# Patient Record
Sex: FEMALE | Race: WHITE | NOT HISPANIC OR LATINO | URBAN - METROPOLITAN AREA
[De-identification: names, ages, dates, MRNs, and addresses within clinical notes are randomized per-mention and may not be internally consistent; named-entity substitution may affect disease eponyms.]

---

## 2017-02-17 ENCOUNTER — EMERGENCY (EMERGENCY)
Facility: HOSPITAL | Age: 22
LOS: 1 days | Discharge: HOME | End: 2017-02-17
Attending: EMERGENCY MEDICINE | Admitting: EMERGENCY MEDICINE
Payer: MEDICAID

## 2017-02-17 VITALS — WEIGHT: 229.94 LBS | HEIGHT: 66 IN

## 2017-02-17 VITALS
HEART RATE: 77 BPM | RESPIRATION RATE: 20 BRPM | DIASTOLIC BLOOD PRESSURE: 90 MMHG | SYSTOLIC BLOOD PRESSURE: 121 MMHG | OXYGEN SATURATION: 99 % | TEMPERATURE: 98 F

## 2017-02-17 PROCEDURE — 73630 X-RAY EXAM OF FOOT: CPT

## 2017-02-17 PROCEDURE — 29515 APPLICATION SHORT LEG SPLINT: CPT

## 2017-02-17 PROCEDURE — 29515 APPLICATION SHORT LEG SPLINT: CPT | Mod: LT

## 2017-02-17 PROCEDURE — 99284 EMERGENCY DEPT VISIT MOD MDM: CPT | Mod: 25

## 2017-02-17 PROCEDURE — 73610 X-RAY EXAM OF ANKLE: CPT

## 2017-02-17 PROCEDURE — 73630 X-RAY EXAM OF FOOT: CPT | Mod: 26,LT

## 2017-02-17 PROCEDURE — 73610 X-RAY EXAM OF ANKLE: CPT | Mod: 26,LT

## 2017-02-17 RX ORDER — IBUPROFEN 200 MG
800 TABLET ORAL ONCE
Qty: 0 | Refills: 0 | Status: COMPLETED | OUTPATIENT
Start: 2017-02-17 | End: 2017-02-17

## 2017-02-17 RX ORDER — IBUPROFEN 200 MG
1 TABLET ORAL
Qty: 40 | Refills: 0 | OUTPATIENT
Start: 2017-02-17 | End: 2017-02-27

## 2017-02-17 RX ADMIN — Medication 800 MILLIGRAM(S): at 17:20

## 2017-02-17 NOTE — ED STATDOCS - MEDICAL DECISION MAKING DETAILS
Left ankle pain and swelling, s/p twisting. Increased pain with ambulating and ambulation. Pt did not fall. Will x-ray for bony injury, give pain meds, and re-evaluate.

## 2017-02-17 NOTE — ED STATDOCS - OBJECTIVE STATEMENT
A 21 year old female pt presents to the ED s/p fall. The pt was walking approx. 1 hour ago when she tripped and twisted her ankle, injuring her left foot. The pt reports left ankle/foot pain that is worse on the lateral aspect of the foot. The pt denies any PMHx, PSHx, or chance that she is pregnant. She did not hit her head or experience LOC. Pt reports that the pain is worse with ambulation. No further complaints at this time.

## 2017-02-17 NOTE — ED STATDOCS - ATTENDING CONTRIBUTION TO CARE
I, Gab Dunn, performed the initial face to face bedside interview with this patient regarding history of present illness, review of symptoms and relevant past medical, social and family history.  I completed an independent physical examination.  I was the initial provider who evaluated this patient. I have signed out the follow up of any pending tests (i.e. labs, radiological studies) to the ACP.  I have communicated the patient’s plan of care and disposition with the ACP.  The history, relevant review of systems, past medical and surgical history, medical decision making, and physical examination was documented by the scribe in my presence and I attest to the accuracy of the documentation.

## 2017-02-17 NOTE — ED STATDOCS - NS ED MD SCRIBE ATTENDING SCRIBE SECTIONS
VITAL SIGNS( Pullset)/PHYSICAL EXAM/DISPOSITION/HISTORY OF PRESENT ILLNESS/HIV/INTAKE ASSESSMENT/SCREENINGS/PAST MEDICAL/SURGICAL/SOCIAL HISTORY/REVIEW OF SYSTEMS

## 2017-02-17 NOTE — ED STATDOCS - PROGRESS NOTE DETAILS
Pt seen and evaluated. Pt s/p trip and fall today while walking with c/o right foot pain. + ttp to metatarsal. No STS. no ecchymosis. No defor. Toes warm and mobile. DP intact. XRAY + mid 5th metatarsal fx. Splint/Shoe/Crutches and Podiatry f/u

## 2017-02-17 NOTE — ED STATDOCS - MUSCULOSKELETAL, MLM
Supple neck, full ROM. Spine is midline. Point tenderness below left lateral malleolus. No gross deformity or erythema. Minimal swelling to the foot.

## 2017-02-23 ENCOUNTER — APPOINTMENT (OUTPATIENT)
Dept: ORTHOPEDIC SURGERY | Facility: CLINIC | Age: 22
End: 2017-02-23

## 2017-03-07 ENCOUNTER — APPOINTMENT (OUTPATIENT)
Dept: ORTHOPEDIC SURGERY | Facility: CLINIC | Age: 22
End: 2017-03-07

## 2017-03-15 ENCOUNTER — OUTPATIENT (OUTPATIENT)
Dept: OUTPATIENT SERVICES | Facility: HOSPITAL | Age: 22
LOS: 1 days | Discharge: HOME | End: 2017-03-15
Payer: MEDICAID

## 2017-03-15 DIAGNOSIS — S92.355A NONDISPLACED FRACTURE OF FIFTH METATARSAL BONE, LEFT FOOT, INITIAL ENCOUNTER FOR CLOSED FRACTURE: ICD-10-CM

## 2017-03-15 DIAGNOSIS — Z01.818 ENCOUNTER FOR OTHER PREPROCEDURAL EXAMINATION: ICD-10-CM

## 2017-03-15 LAB
ABO RH CONFIRMATION: SIGNIFICANT CHANGE UP
ANION GAP SERPL CALC-SCNC: 8 MMOL/L — SIGNIFICANT CHANGE UP (ref 5–17)
APPEARANCE UR: CLEAR — SIGNIFICANT CHANGE UP
APTT BLD: 31 SEC — SIGNIFICANT CHANGE UP (ref 27.5–37.4)
BASOPHILS # BLD AUTO: 0.1 K/UL — SIGNIFICANT CHANGE UP (ref 0–0.2)
BASOPHILS NFR BLD AUTO: 0.8 % — SIGNIFICANT CHANGE UP (ref 0–2)
BILIRUB UR-MCNC: NEGATIVE — SIGNIFICANT CHANGE UP
BLD GP AB SCN SERPL QL: SIGNIFICANT CHANGE UP
BUN SERPL-MCNC: 12 MG/DL — SIGNIFICANT CHANGE UP (ref 7–23)
CALCIUM SERPL-MCNC: 8.9 MG/DL — SIGNIFICANT CHANGE UP (ref 8.5–10.1)
CHLORIDE SERPL-SCNC: 104 MMOL/L — SIGNIFICANT CHANGE UP (ref 96–108)
CO2 SERPL-SCNC: 30 MMOL/L — SIGNIFICANT CHANGE UP (ref 22–31)
COLOR SPEC: YELLOW — SIGNIFICANT CHANGE UP
CREAT SERPL-MCNC: 0.66 MG/DL — SIGNIFICANT CHANGE UP (ref 0.5–1.3)
DIFF PNL FLD: NEGATIVE — SIGNIFICANT CHANGE UP
EOSINOPHIL # BLD AUTO: 0.5 K/UL — SIGNIFICANT CHANGE UP (ref 0–0.5)
EOSINOPHIL NFR BLD AUTO: 6.6 % — HIGH (ref 0–6)
GLUCOSE SERPL-MCNC: 81 MG/DL — SIGNIFICANT CHANGE UP (ref 70–99)
GLUCOSE UR QL: NEGATIVE MG/DL — SIGNIFICANT CHANGE UP
HCT VFR BLD CALC: 47.2 % — HIGH (ref 34.5–45)
HGB BLD-MCNC: 16 G/DL — HIGH (ref 11.5–15.5)
INR BLD: 0.95 RATIO — SIGNIFICANT CHANGE UP (ref 0.88–1.16)
KETONES UR-MCNC: NEGATIVE — SIGNIFICANT CHANGE UP
LEUKOCYTE ESTERASE UR-ACNC: NEGATIVE — SIGNIFICANT CHANGE UP
LYMPHOCYTES # BLD AUTO: 2.2 K/UL — SIGNIFICANT CHANGE UP (ref 1–3.3)
LYMPHOCYTES # BLD AUTO: 27.9 % — SIGNIFICANT CHANGE UP (ref 13–44)
MCHC RBC-ENTMCNC: 29.4 PG — SIGNIFICANT CHANGE UP (ref 27–34)
MCHC RBC-ENTMCNC: 34 GM/DL — SIGNIFICANT CHANGE UP (ref 32–36)
MCV RBC AUTO: 86.7 FL — SIGNIFICANT CHANGE UP (ref 80–100)
MONOCYTES # BLD AUTO: 0.5 K/UL — SIGNIFICANT CHANGE UP (ref 0–0.9)
MONOCYTES NFR BLD AUTO: 6.7 % — SIGNIFICANT CHANGE UP (ref 2–14)
MRSA PCR RESULT.: SIGNIFICANT CHANGE UP
NEUTROPHILS # BLD AUTO: 4.7 K/UL — SIGNIFICANT CHANGE UP (ref 1.8–7.4)
NEUTROPHILS NFR BLD AUTO: 58 % — SIGNIFICANT CHANGE UP (ref 43–77)
NITRITE UR-MCNC: NEGATIVE — SIGNIFICANT CHANGE UP
PH UR: 7 — SIGNIFICANT CHANGE UP (ref 4.8–8)
PLATELET # BLD AUTO: 274 K/UL — SIGNIFICANT CHANGE UP (ref 150–400)
POTASSIUM SERPL-MCNC: 4.6 MMOL/L — SIGNIFICANT CHANGE UP (ref 3.5–5.3)
POTASSIUM SERPL-SCNC: 4.6 MMOL/L — SIGNIFICANT CHANGE UP (ref 3.5–5.3)
PROT UR-MCNC: NEGATIVE MG/DL — SIGNIFICANT CHANGE UP
PROTHROM AB SERPL-ACNC: 10.4 SEC — SIGNIFICANT CHANGE UP (ref 10–13.1)
RBC # BLD: 5.45 M/UL — HIGH (ref 3.8–5.2)
RBC # FLD: 12.1 % — SIGNIFICANT CHANGE UP (ref 10.3–14.5)
S AUREUS DNA NOSE QL NAA+PROBE: SIGNIFICANT CHANGE UP
SODIUM SERPL-SCNC: 142 MMOL/L — SIGNIFICANT CHANGE UP (ref 135–145)
SP GR SPEC: 1.01 — SIGNIFICANT CHANGE UP (ref 1.01–1.02)
TYPE + AB SCN PNL BLD: SIGNIFICANT CHANGE UP
UROBILINOGEN FLD QL: NEGATIVE MG/DL — SIGNIFICANT CHANGE UP
WBC # BLD: 8.1 K/UL — SIGNIFICANT CHANGE UP (ref 3.8–10.5)
WBC # FLD AUTO: 8.1 K/UL — SIGNIFICANT CHANGE UP (ref 3.8–10.5)

## 2017-03-15 PROCEDURE — 93010 ELECTROCARDIOGRAM REPORT: CPT

## 2017-03-15 NOTE — ASU PATIENT PROFILE, ADULT - PMH
Asthma  as a child  Fracture of bone  left 5th metatarsal  MRSA (methicillin resistant Staphylococcus aureus)    Obesity

## 2017-03-15 NOTE — CHART NOTE - NSCHARTNOTEFT_GEN_A_CORE
Plan  1. Stop all NSAIDS, herbal supplements and vitamins for 7 days.  2. NPO at midnight.  3. Use EZ sponges as directed  4. Use mupirocin as directed

## 2017-03-20 RX ORDER — FENTANYL CITRATE 50 UG/ML
50 INJECTION INTRAVENOUS
Qty: 0 | Refills: 0 | Status: DISCONTINUED | OUTPATIENT
Start: 2017-03-21 | End: 2017-03-21

## 2017-03-20 RX ORDER — ONDANSETRON 8 MG/1
4 TABLET, FILM COATED ORAL ONCE
Qty: 0 | Refills: 0 | Status: COMPLETED | OUTPATIENT
Start: 2017-03-21 | End: 2017-03-21

## 2017-03-20 RX ORDER — FAMOTIDINE 10 MG/ML
20 INJECTION INTRAVENOUS ONCE
Qty: 0 | Refills: 0 | Status: COMPLETED | OUTPATIENT
Start: 2017-03-21 | End: 2017-03-21

## 2017-03-20 RX ORDER — SODIUM CHLORIDE 9 MG/ML
3 INJECTION INTRAMUSCULAR; INTRAVENOUS; SUBCUTANEOUS EVERY 8 HOURS
Qty: 0 | Refills: 0 | Status: DISCONTINUED | OUTPATIENT
Start: 2017-03-21 | End: 2017-03-21

## 2017-03-20 RX ORDER — SODIUM CHLORIDE 9 MG/ML
1000 INJECTION INTRAMUSCULAR; INTRAVENOUS; SUBCUTANEOUS
Qty: 0 | Refills: 0 | Status: DISCONTINUED | OUTPATIENT
Start: 2017-03-21 | End: 2017-03-21

## 2017-03-21 ENCOUNTER — OUTPATIENT (OUTPATIENT)
Dept: OUTPATIENT SERVICES | Facility: HOSPITAL | Age: 22
LOS: 1 days | Discharge: HOME | End: 2017-03-21

## 2017-03-21 ENCOUNTER — APPOINTMENT (OUTPATIENT)
Dept: ORTHOPEDIC SURGERY | Facility: HOSPITAL | Age: 22
End: 2017-03-21

## 2017-03-21 VITALS
DIASTOLIC BLOOD PRESSURE: 65 MMHG | RESPIRATION RATE: 16 BRPM | SYSTOLIC BLOOD PRESSURE: 118 MMHG | OXYGEN SATURATION: 99 % | TEMPERATURE: 98 F | WEIGHT: 250.45 LBS | HEIGHT: 66 IN

## 2017-03-21 VITALS
TEMPERATURE: 97 F | HEART RATE: 60 BPM | RESPIRATION RATE: 16 BRPM | DIASTOLIC BLOOD PRESSURE: 57 MMHG | SYSTOLIC BLOOD PRESSURE: 123 MMHG | OXYGEN SATURATION: 98 %

## 2017-03-21 LAB — HCG UR QL: NEGATIVE — SIGNIFICANT CHANGE UP

## 2017-03-21 RX ORDER — OXYCODONE AND ACETAMINOPHEN 5; 325 MG/1; MG/1
1 TABLET ORAL
Qty: 28 | Refills: 0
Start: 2017-03-21 | End: 2017-03-28

## 2017-03-21 RX ORDER — HYDROMORPHONE HYDROCHLORIDE 2 MG/ML
1 INJECTION INTRAMUSCULAR; INTRAVENOUS; SUBCUTANEOUS
Qty: 0 | Refills: 0 | Status: DISCONTINUED | OUTPATIENT
Start: 2017-03-21 | End: 2017-03-21

## 2017-03-21 RX ORDER — SODIUM CHLORIDE 9 MG/ML
1000 INJECTION, SOLUTION INTRAVENOUS
Qty: 0 | Refills: 0 | Status: DISCONTINUED | OUTPATIENT
Start: 2017-03-21 | End: 2017-04-05

## 2017-03-21 RX ORDER — MEPERIDINE HYDROCHLORIDE 50 MG/ML
12.5 INJECTION INTRAMUSCULAR; INTRAVENOUS; SUBCUTANEOUS
Qty: 0 | Refills: 0 | Status: DISCONTINUED | OUTPATIENT
Start: 2017-03-21 | End: 2017-03-21

## 2017-03-21 RX ORDER — OXYCODONE HYDROCHLORIDE 5 MG/1
5 TABLET ORAL EVERY 4 HOURS
Qty: 0 | Refills: 0 | Status: DISCONTINUED | OUTPATIENT
Start: 2017-03-21 | End: 2017-03-21

## 2017-03-21 RX ADMIN — ONDANSETRON 4 MILLIGRAM(S): 8 TABLET, FILM COATED ORAL at 14:30

## 2017-03-21 RX ADMIN — SODIUM CHLORIDE 75 MILLILITER(S): 9 INJECTION INTRAMUSCULAR; INTRAVENOUS; SUBCUTANEOUS at 14:49

## 2017-03-21 RX ADMIN — FENTANYL CITRATE 50 MICROGRAM(S): 50 INJECTION INTRAVENOUS at 14:45

## 2017-03-21 RX ADMIN — FENTANYL CITRATE 50 MICROGRAM(S): 50 INJECTION INTRAVENOUS at 14:40

## 2017-03-21 RX ADMIN — FENTANYL CITRATE 50 MICROGRAM(S): 50 INJECTION INTRAVENOUS at 15:16

## 2017-03-21 RX ADMIN — FAMOTIDINE 20 MILLIGRAM(S): 10 INJECTION INTRAVENOUS at 09:28

## 2017-03-21 RX ADMIN — FENTANYL CITRATE 50 MICROGRAM(S): 50 INJECTION INTRAVENOUS at 15:10

## 2017-03-21 NOTE — ASU DISCHARGE PLAN (ADULT/PEDIATRIC). - NURSING INSTRUCTIONS
Begin with liquids and light food ( tea, toast, Jello, soups). Advance to what you normally eat. Liquids should taken in adequate amounts today.     CALL the DOCTOR:    -Fever greater than  101F  - Signs  of infection such as : increase pain,swelling,redness,or a bad  smell coming from the wound.  -Excessive amount of bleeding.  - Any pain that appears to be getting worse.  - Vomiting  -  If you have  not urinated 8 hours after surgery or have any difficulty urinating.   - difficulty breathing    A responsible adult should be with you for the rest of the day and night for your safety and to help you if you needed.    Review attached FACT SHEET if applicable.

## 2017-03-21 NOTE — ASU DISCHARGE PLAN (ADULT/PEDIATRIC). - NOTIFY
Numbness, tingling/Bleeding that does not stop/Numbness, color, or temperature change to extremity/Pain not relieved by Medications/Swelling that continues/Fever greater than 101

## 2017-03-21 NOTE — ASU DISCHARGE PLAN (ADULT/PEDIATRIC). - MEDICATION SUMMARY - MEDICATIONS TO TAKE
I will START or STAY ON the medications listed below when I get home from the hospital:    oxyCODONE-acetaminophen 5mg-325mg oral tablet  -- 1 tab(s) by mouth every 6 hours, As Needed MDD:4  -- Caution federal law prohibits the transfer of this drug to any person other  than the person for whom it was prescribed.  May cause drowsiness.  Alcohol may intensify this effect.  Use care when operating dangerous machinery.  This prescription cannot be refilled.  This product contains acetaminophen.  Do not use  with any other product containing acetaminophen to prevent possible liver damage.  Using more of this medication than prescribed may cause serious breathing problems.    -- Indication: For pain

## 2017-03-21 NOTE — BRIEF OPERATIVE NOTE - PROCEDURE
Open reduction and internal fixation (ORIF) of fracture of metatarsal bone  03/21/2017    Active  RIKKI

## 2017-03-21 NOTE — BRIEF OPERATIVE NOTE - POST-OP DX
Closed nondisplaced fracture of fifth metatarsal bone of left foot, initial encounter  03/21/2017    Active  Brannon Jesus
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
## 2017-03-28 DIAGNOSIS — S92.352A DISPLACED FRACTURE OF FIFTH METATARSAL BONE, LEFT FOOT, INITIAL ENCOUNTER FOR CLOSED FRACTURE: ICD-10-CM
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
## 2017-03-31 ENCOUNTER — APPOINTMENT (OUTPATIENT)
Dept: ORTHOPEDIC SURGERY | Facility: CLINIC | Age: 22
End: 2017-03-31

## 2017-03-31 DIAGNOSIS — S92.355A NONDISPLACED FRACTURE OF FIFTH METATARSAL BONE, LEFT FOOT, INITIAL ENCOUNTER FOR CLOSED FRACTURE: ICD-10-CM

## 2017-04-05 ENCOUNTER — APPOINTMENT (OUTPATIENT)
Dept: ORTHOPEDIC SURGERY | Facility: CLINIC | Age: 22
End: 2017-04-05

## 2017-04-14 ENCOUNTER — APPOINTMENT (OUTPATIENT)
Dept: ORTHOPEDIC SURGERY | Facility: CLINIC | Age: 22
End: 2017-04-14

## 2017-05-12 ENCOUNTER — APPOINTMENT (OUTPATIENT)
Dept: ORTHOPEDIC SURGERY | Facility: CLINIC | Age: 22
End: 2017-05-12

## 2017-05-12 DIAGNOSIS — S92.355D NONDISPLACED FRACTURE OF FIFTH METATARSAL BONE, LEFT FOOT, SUBSEQUENT ENCOUNTER FOR FRACTURE WITH ROUTINE HEALING: ICD-10-CM

## 2017-06-23 ENCOUNTER — APPOINTMENT (OUTPATIENT)
Dept: ORTHOPEDIC SURGERY | Facility: CLINIC | Age: 22
End: 2017-06-23

## 2017-07-22 ENCOUNTER — EMERGENCY (EMERGENCY)
Facility: HOSPITAL | Age: 22
LOS: 1 days | Discharge: HOME | End: 2017-07-22
Attending: EMERGENCY MEDICINE
Payer: MEDICAID

## 2017-07-22 VITALS
WEIGHT: 244.93 LBS | HEIGHT: 65 IN | TEMPERATURE: 98 F | RESPIRATION RATE: 20 BRPM | DIASTOLIC BLOOD PRESSURE: 75 MMHG | OXYGEN SATURATION: 92 % | HEART RATE: 94 BPM | SYSTOLIC BLOOD PRESSURE: 140 MMHG

## 2017-07-22 VITALS
DIASTOLIC BLOOD PRESSURE: 92 MMHG | SYSTOLIC BLOOD PRESSURE: 152 MMHG | RESPIRATION RATE: 16 BRPM | HEART RATE: 90 BPM | OXYGEN SATURATION: 97 % | TEMPERATURE: 98 F

## 2017-07-22 PROCEDURE — 99285 EMERGENCY DEPT VISIT HI MDM: CPT | Mod: 25

## 2017-07-22 PROCEDURE — 94640 AIRWAY INHALATION TREATMENT: CPT

## 2017-07-22 PROCEDURE — 99283 EMERGENCY DEPT VISIT LOW MDM: CPT

## 2017-07-22 RX ORDER — ALBUTEROL 90 UG/1
2 AEROSOL, METERED ORAL EVERY 4 HOURS
Qty: 0 | Refills: 0 | Status: DISCONTINUED | OUTPATIENT
Start: 2017-07-22 | End: 2017-07-26

## 2017-07-22 RX ORDER — ALBUTEROL 90 UG/1
2.5 AEROSOL, METERED ORAL
Qty: 0 | Refills: 0 | Status: COMPLETED | OUTPATIENT
Start: 2017-07-22 | End: 2017-07-22

## 2017-07-22 RX ADMIN — ALBUTEROL 2.5 MILLIGRAM(S): 90 AEROSOL, METERED ORAL at 13:31

## 2017-07-22 RX ADMIN — ALBUTEROL 2.5 MILLIGRAM(S): 90 AEROSOL, METERED ORAL at 13:25

## 2017-07-22 RX ADMIN — Medication 40 MILLIGRAM(S): at 13:24

## 2017-07-22 RX ADMIN — ALBUTEROL 2.5 MILLIGRAM(S): 90 AEROSOL, METERED ORAL at 15:41

## 2017-07-22 RX ADMIN — ALBUTEROL 2 PUFF(S): 90 AEROSOL, METERED ORAL at 15:41

## 2017-07-22 NOTE — ED STATDOCS - PROGRESS NOTE DETAILS
NP NOTE: Pt seen by intake physician and HPI/ROS/PE/MDM reviewed. Pt seen and evaluated. Discussed plan and any resulted studies at this time. Will continue to monitor and re-evaluate.  Re-Evaluation: pt receiving treatments at this time, LS with diffuse wheeze. no acute distress at this time, will continue to re-eval. peak flow 350. will give 2 puffs with mdi+spacer and dc. improved aeration, mild end-expiratory wheeze. pt feeling improved, will dc, pt will fu with pmd.

## 2017-07-22 NOTE — ED STATDOCS - ATTENDING CONTRIBUTION TO CARE
I, Mariana Win, performed the initial face to face bedside interview with this patient regarding history of present illness, review of symptoms and relevant past medical, social and family history.  I completed an independent physical examination.  I was the initial provider who evaluated this patient. I have signed out the follow up of any pending tests (i.e. labs, radiological studies) to the ACP.  I have communicated the patient’s plan of care and disposition with the ACP.  The history, relevant review of systems, past medical and surgical history, medical decision making, and physical examination was documented by the scribe in my presence and I attest to the accuracy of the documentation.

## 2017-07-22 NOTE — ED STATDOCS - OBJECTIVE STATEMENT
21 y/o F pt with hx of asthma and allergies presents to ED c/o productive cough, wheezing for 4-5 days. Pt states she hasn't had an asthma attack in a long day. Hospitalization needed when she was younger. No medication at  home. No chest pain, SOB, abdominal pain, nausea, vomiting, fevers, chills, rhinorrhea. No further complaints at this time.

## 2018-04-17 ENCOUNTER — EMERGENCY (EMERGENCY)
Facility: HOSPITAL | Age: 23
LOS: 1 days | Discharge: HOME | End: 2018-04-17
Attending: EMERGENCY MEDICINE
Payer: MEDICAID

## 2018-04-17 VITALS
OXYGEN SATURATION: 97 % | TEMPERATURE: 98 F | RESPIRATION RATE: 20 BRPM | DIASTOLIC BLOOD PRESSURE: 83 MMHG | HEART RATE: 64 BPM | SYSTOLIC BLOOD PRESSURE: 146 MMHG

## 2018-04-17 VITALS — HEIGHT: 66 IN | WEIGHT: 240.08 LBS

## 2018-04-17 PROCEDURE — 99284 EMERGENCY DEPT VISIT MOD MDM: CPT

## 2018-04-17 NOTE — ED ADULT TRIAGE NOTE - CHIEF COMPLAINT QUOTE
"I fell off my bed & hit my jaw in the middle of the night, I waited to see if it would heal on its own but I am having trouble opening my mouth all the way."

## 2018-04-18 PROCEDURE — 99284 EMERGENCY DEPT VISIT MOD MDM: CPT | Mod: 25

## 2018-04-18 PROCEDURE — 70486 CT MAXILLOFACIAL W/O DYE: CPT | Mod: 26

## 2018-04-18 PROCEDURE — 70486 CT MAXILLOFACIAL W/O DYE: CPT

## 2018-04-18 RX ORDER — ACETAMINOPHEN 500 MG
650 TABLET ORAL ONCE
Qty: 0 | Refills: 0 | Status: COMPLETED | OUTPATIENT
Start: 2018-04-18 | End: 2018-04-18

## 2018-04-18 RX ADMIN — Medication 650 MILLIGRAM(S): at 02:25

## 2018-04-18 NOTE — ED PROVIDER NOTE - ENMT, MLM
Tenderness to palpation to angle of left mandible, no swelling. Airway patent, Nasal mucosa clear. Mouth with normal mucosa. Throat has no vesicles, no oropharyngeal exudates and uvula is midline.

## 2018-04-18 NOTE — ED ADULT NURSE NOTE - OBJECTIVE STATEMENT
pt a&ox3 c/o jaw pain s/p hitting jaw on side of bed, maex4, rr even and unlabored, pt states "when I open my jaw I hear a crunching sound"

## 2018-04-18 NOTE — ED PROVIDER NOTE - OBJECTIVE STATEMENT
23 y/o F, with hx of asthma and MRSA, presents to the ED c/o left sided jaw pain and headache, onset yesterday.  Pt states that last night, she fell out of bed while sleeping and hit the left side of her face.  Pain is worse with movement.  Denies LOC.  denies fever. denies neck pain. no chest pain or sob. no abd pain. no n/v/d. no urinary f/u/d. no back pain. no motor or sensory deficits. denies illicit drug use. no recent travel. no rash. no other acute issues symptoms or concerns

## 2018-04-23 NOTE — BRIEF OPERATIVE NOTE - ASSISTANT(S)
Reason for Admission:respiratory failure                  RRAT Score: 23                 Resources/supports as identified by patient/family: family                  Top Challenges facing patient (as identified by patient/family and CM): Finances/Medication cost?  no                  Transportation? no                Support system or lack thereof? Living arrangements? self               Self-care/ADLs/Cognition? no           Current Advanced Directive/Advance Care Plan:  Full code                          Plan for utilizing home health:  no                        Likelihood of readmission:no                  Transition of Care Plan: chart reviewed pt came to ED via EMS with c/o SOB positive drug screen for cocaine,pt has hx COPD,ASTHMA,Hypertension, at this time pt remains in ICU,cm will remain available for d/c planning. NIRAJ De La Cruz

## 2022-12-12 ENCOUNTER — EMERGENCY (EMERGENCY)
Facility: HOSPITAL | Age: 27
LOS: 1 days | Discharge: HOME | End: 2022-12-12
Attending: EMERGENCY MEDICINE
Payer: SELF-PAY

## 2022-12-12 VITALS
SYSTOLIC BLOOD PRESSURE: 120 MMHG | HEART RATE: 77 BPM | DIASTOLIC BLOOD PRESSURE: 76 MMHG | TEMPERATURE: 98 F | OXYGEN SATURATION: 99 % | RESPIRATION RATE: 18 BRPM

## 2022-12-12 PROCEDURE — 99283 EMERGENCY DEPT VISIT LOW MDM: CPT

## 2022-12-12 PROCEDURE — 99284 EMERGENCY DEPT VISIT MOD MDM: CPT

## 2022-12-12 PROCEDURE — 96372 THER/PROPH/DIAG INJ SC/IM: CPT

## 2022-12-12 RX ORDER — IBUPROFEN 200 MG
1 TABLET ORAL
Qty: 20 | Refills: 0
Start: 2022-12-12 | End: 2022-12-16

## 2022-12-12 RX ORDER — METHOCARBAMOL 500 MG/1
1 TABLET, FILM COATED ORAL
Qty: 14 | Refills: 0
Start: 2022-12-12 | End: 2022-12-18

## 2022-12-12 RX ORDER — METHOCARBAMOL 500 MG/1
1500 TABLET, FILM COATED ORAL ONCE
Refills: 0 | Status: COMPLETED | OUTPATIENT
Start: 2022-12-12 | End: 2022-12-12

## 2022-12-12 RX ORDER — KETOROLAC TROMETHAMINE 30 MG/ML
30 SYRINGE (ML) INJECTION ONCE
Refills: 0 | Status: DISCONTINUED | OUTPATIENT
Start: 2022-12-12 | End: 2022-12-12

## 2022-12-12 RX ADMIN — METHOCARBAMOL 1500 MILLIGRAM(S): 500 TABLET, FILM COATED ORAL at 18:57

## 2022-12-12 RX ADMIN — Medication 30 MILLIGRAM(S): at 18:58

## 2022-12-12 NOTE — ED ADULT TRIAGE NOTE - HISTORY OF COVID-19 VACCINATION
Hospitalist History & Physical      PCP: Amanda Licona    Date of Service: Pt seen/examined on 6/21/2022     Chief Complaint:  had concerns including Shortness of Breath (Dx with covid 3 weeks ago, has been increasingly sob last few days, states he drops to mid 70s on pulse ox at home when ambulating. Pt coughing up yellow sputum, Pt has SpO2 of 86% on RA in triage ). History Of Present Illness:    Mr. Nicolas Skaggs, a 49y.o. year old male  who  has a past medical history of Acid reflux, Asthma, Asthma, COPD (chronic obstructive pulmonary disease) ([address] Hypertension, Pancreatitis, Prediabetes, Psychiatric problem, Sleep apnea, and Substance abuse ([address] Patient presented to the emergency department with shortness of breath. Diagnosed with COVID 3 weeks ago. Has been increasingly short of breath in the last 3 days. Has a pulse ox at home and states he has been in the 70s. On arrival to the emergency department he is 86% on room air. Denies fever, chest pain, palpitations, nausea, vomiting, abdominal pain. Vital signs reveal the patient be tachycardic with a rate of 107. EKG shows sinus tachycardia. Laboratory studies demonstrate potassium 3.4, CO2 31, glucose 138, troponin 16 with repeat of 15. Imaging reveals bibasilar atelectasis/groundglass disease. Patient was given ceftriaxone and doxycycline in emergency department as well as DuoNeb and methylprednisolone. Medicine consulted for admission.       Past Medical History:   Diagnosis Date    Acid reflux     Asthma     Asthma     COPD (chronic obstructive pulmonary disease) ([address]     Hypertension     Pancreatitis     Prediabetes     Psychiatric problem     depressed    Sleep apnea     Substance abuse ([address]     alcohol and cocaine       Past Surgical History:   Procedure Laterality Date    COLONOSCOPY      2010 neg    NERVE BLOCK Left 08/27/2018    lumbar epidural #1 paramedian    WI NJX DX/THER SBST INTRLMNR LMBR/SAC W/IMG GDN Left 8/27/2018    LUMBAR EPIDURAL STEROID INJECTION L4-5 LEFT PARAMEDIAN #1 performed by Lou Copeland MD at [place] [address]         Prior to Admission medications    Medication Sig Start Date End Date Taking? Authorizing Provider   gabapentin (NEURONTIN) 300 MG capsule Take 300 mg by mouth 3 times daily. Historical Provider, MD   oxybutynin (DITROPAN-XL) 5 MG extended release tablet Take 5 mg by mouth daily    Historical Provider, MD   albuterol (PROVENTIL) (2.5 MG/3ML) 0.083% nebulizer solution Take 2.5 mg by nebulization every 6 hours as needed for Wheezing    Historical Provider, MD   albuterol sulfate HFA (VENTOLIN HFA) 108 (90 Base) MCG/ACT inhaler Inhale 2 puffs into the lungs every 6 hours as needed for Wheezing    Historical Provider, MD   atorvastatin (LIPITOR) 10 MG tablet Take 10 mg by mouth daily    Historical Provider, MD   buPROPion (WELLBUTRIN SR) 150 MG extended release tablet Take 150 mg by mouth daily    Historical Provider, MD   zolpidem (AMBIEN) 5 MG tablet Take 5 mg by mouth nightly as needed for Sleep. Historical Provider, MD   Fluticasone-Umeclidin-Vilant (TRELEGY ELLIPTA) 200-62.5-25 MCG/INH AEPB Inhale 2 puffs into the lungs 2 times daily     Historical Provider, MD   aspirin 81 MG chewable tablet Take 1 tablet by mouth daily 11/18/20   Cyndy Merlos MD   tamsulosin (FLOMAX) 0.4 MG capsule Take 0.8 mg by mouth at bedtime     Historical Provider, MD   amLODIPine (NORVASC) 10 MG tablet Take 1 tablet by mouth daily. 1/16/15   Sierra Daily MD         Allergies:  Dust mite extract    Social History:    TOBACCO:   reports that he has been smoking cigarettes and cigars. He has a 28.00 pack-year smoking history. He has never used smokeless tobacco.  ETOH:   reports current alcohol use. Family History:    Reviewed in detail and negative for DM, CAD, Cancer, CVA.  Positive as follows\"      Problem Relation Age of Onset    Other Sister     High Blood Pressure Results   Component Value Date    LACTA 1.0 06/02/2022     Thyroid Studies:   Lab Results   Component Value Date    TSH 1.740 10/14/2020       Oupatient labs:  Lab Results   Component Value Date    CHOL 156 04/20/2020    TRIG 171 (H) 02/14/2022    HDL 58 04/20/2020    LDLCALC 76 04/20/2020    TSH 1.740 10/14/2020    PSA 0.60 10/28/2015    INR 1.1 11/14/2020    LABA1C 5.8 (H) 06/03/2022       Urinalysis:    Lab Results   Component Value Date    NITRU Negative 06/25/2020    WBCUA 1-3 06/25/2020    BACTERIA FEW 06/25/2020    RBCUA NONE 06/25/2020    RBCUA NONE 02/13/2014    BLOODU LARGE 06/25/2020    SPECGRAV >=1.030 06/25/2020    GLUCOSEU Negative 06/25/2020       Imaging:  XR CHEST (2 VW)    Result Date: 6/1/2022  EXAMINATION: TWO XRAY VIEWS OF THE CHEST 6/1/2022 5:20 pm COMPARISON: February 13, 2022 HISTORY: ORDERING SYSTEM PROVIDED HISTORY: cough TECHNOLOGIST PROVIDED HISTORY: Reason for exam:->cough Reason for exam:->SOB What reading provider will be dictating this exam?->CRC FINDINGS: No airspace opacity or pleural effusion. The heart is normal size. No pneumothorax. No free air beneath the hemidiaphragms. No pneumonia or pleural effusion. XR CHEST PORTABLE    Result Date: 6/21/2022  EXAMINATION: ONE XRAY VIEW OF THE CHEST 6/21/2022 12:29 am COMPARISON: Luci 3, 2022. HISTORY: ORDERING SYSTEM PROVIDED HISTORY: sob TECHNOLOGIST PROVIDED HISTORY: Reason for exam:->sob What reading provider will be dictating this exam?->CRC FINDINGS: The cardiomediastinal contours and pulmonary vasculature are within normal limits. Ismael Chary left basilar opacity is noted, which may suggest atelectasis or alternatively, parenchymal disease. No effusion or pneumothorax is seen. No acute osseous abnormalities are identified. Left basilar disease, which may suggest atelectasis or alternatively, pneumonia in the appropriate setting.      XR CHEST PORTABLE    Result Date: 6/3/2022  EXAMINATION: ONE XRAY VIEW OF THE CHEST 6/3/2022 9:01 am COMPARISON: 06/01/2022 HISTORY: ORDERING SYSTEM PROVIDED HISTORY: COVID; eval for pneumoniae TECHNOLOGIST PROVIDED HISTORY: Reason for exam:->COVID; eval for pneumoniae What reading provider will be dictating this exam?->CRC FINDINGS: Heart size is normal.  There are no infiltrates or effusions. Normal chest     CTA PULMONARY W CONTRAST    Result Date: 6/21/2022  EXAMINATION: CTA OF THE CHEST 6/21/2022 2:34 am TECHNIQUE: CTA of the chest was performed after the administration of intravenous contrast.  Multiplanar reformatted images are provided for review. MIP images are provided for review. Automated exposure control, iterative reconstruction, and/or weight based adjustment of the mA/kV was utilized to reduce the radiation dose to as low as reasonably achievable. COMPARISON: None. HISTORY: ORDERING SYSTEM PROVIDED HISTORY: SOb hypoxic, recent COVID TECHNOLOGIST PROVIDED HISTORY: Reason for exam:->SOb hypoxic, recent COVID Decision Support Exception - unselect if not a suspected or confirmed emergency medical condition->Emergency Medical Condition (MA) What reading provider will be dictating this exam?->CRC FINDINGS: Pulmonary Arteries: Pulmonary arteries are adequately opacified for evaluation. No evidence of intraluminal filling defect to suggest pulmonary embolism. Main pulmonary artery is normal in caliber. Mediastinum: No evidence of mediastinal lymphadenopathy. The heart and pericardium demonstrate no acute abnormality. There is no acute abnormality of the thoracic aorta. Lungs/pleura: Bibasilar atelectasis/ground-glass disease. The lungs are otherwise clear. No mass, effusion or pneumothorax. Upper Abdomen: Limited images of the upper abdomen are unremarkable. Soft Tissues/Bones: No acute bone or soft tissue abnormality. Minimal disease involving the lower lungs. This may represent consolidative changes or alternatively, atelectasis.  No pulmonary embolism, aortic aneurysm or dissection. ASSESSMENT:  -Acute hypoxic respiratory failure  -Bibasilar pneumonia versus COVID-pneumonia  -Sinus tachycardia  -Hypokalemia  -History of COPD  -Hypertension  -Hyperlipidemia      PLAN:  -Admit to medicine  -Check rapid COVID  -Doxycycline and ceftriaxone  -Respiratory cultures  -Telemetry  -Monitor serum electrolytes replete as needed  -DuoNebs every 4 hours while awake  -Continue home medications        Diet: No diet orders on file  Code Status: Prior  Surrogate decision maker confirmed with patient:   Extended Emergency Contact Information  Primary Emergency Contact: Prakash Anthony  Address: [facility] [address] [address]  Home Phone: [phone]  Mobile Phone: [phone]  Relation: Brother/Sister   needed? No  Secondary Emergency Contact: Mariajose Waller  Home Phone: [phone]  Mobile Phone: [phone]  Relation: Other   needed? No    DVT Prophylaxis: []Lovenox []Heparin []PCD [] [address] []Encouraged ambulation  Disposition: []Med/Surg [] Intermediate [] ICU/CCU  Admit status: [] Observation [] Inpatient     +++++++++++++++++++++++++++++++++++++++++++++++++  Mai Quezada DO  +++++++++++++++++++++++++++++++++++++++++++++++++  NOTE: This report was transcribed using voice recognition software. Every effort was made to ensure accuracy; however, inadvertent computerized transcription errors may be present. Yes

## 2022-12-12 NOTE — ED PROVIDER NOTE - PHYSICAL EXAMINATION
HEENT: atraumatic, no ramoon eyes, no luna sings, no hemotympanum, PERRL, EOMI, no nystagmus, no dental injuries  Neck: supple, no midline tenderness to palpation, + FROM, NEXUS negative, no abrasions, no ecchymosis  Chest: non tender, equal expansion bilaterally, no ecchymosis, no abrasions,   Lungs: CTA, good air entry bilaterally, no wheezing, no rales, no rhonchi  Abdomen: soft, non tender, no guarding, no rebound, no distention, no ecchymosis  Back: no midline tenderness to palpation   Extremities: atraumatic, + FROM  Skin: no rash  Neuro: A & O x 3, clear speech, steady gait, cerebellar intact, no focal deficits.

## 2022-12-12 NOTE — ED ADULT TRIAGE NOTE - CHIEF COMPLAINT QUOTE
fell 1 week ago hurt neck and back was hoping it would go away but is now getting worse. able to ambulate without difficulty. tripped off the curb, landed on shoulder and hip.

## 2022-12-12 NOTE — ED PROVIDER NOTE - OBJECTIVE STATEMENT
27-year-old female status post fall off a curb 1 week ago presents complaining of back and left shoulder pain.  Patient reports she thought the pain would go away however it has persisted.  Denies headache, dizziness, LOC, neck pain, chest pain, shortness of breath, or abdominal pain.  No blood thinners.

## 2022-12-12 NOTE — ED PROVIDER NOTE - CLINICAL SUMMARY MEDICAL DECISION MAKING FREE TEXT BOX
27-year-old female status post fall 1 week ago with shoulder and back pain.  Exam not concerning for bony injury.  Analgesics follow-up primary care.  ED return precautions discussed

## 2022-12-12 NOTE — ED PROVIDER NOTE - CROS ED SKIN ALL NEG
Pre-application: Motor, sensory, and vascular responses intact in the injured extremity./Post-application: Motor, sensory, and vascular responses intact in the injured extremity./The patient/caregiver verbalized understanding of how to care for the injured extremity with splint
negative...

## 2022-12-12 NOTE — ED PROVIDER NOTE - NSICDXPASTMEDICALHX_GEN_ALL_CORE_FT
PAST MEDICAL HISTORY:  Asthma as a child    Fracture of bone left 5th metatarsal    MRSA (methicillin resistant Staphylococcus aureus)     Obesity

## 2022-12-12 NOTE — ED PROVIDER NOTE - NSFOLLOWUPINSTRUCTIONS_ED_ALL_ED_FT
Back Pain    Back pain is very common in adults. The cause of back pain is rarely dangerous and the pain often gets better over time. The cause of your back pain may not be known and may include strain of muscles or ligaments, degeneration of the spinal disks, or arthritis. Occasionally the pain may radiate down your leg(s). Over-the-counter medicines to reduce pain and inflammation are often the most helpful. Stretching and remaining active frequently helps the healing process.     SEEK IMMEDIATE MEDICAL CARE IF YOU HAVE ANY OF THE FOLLOWING SYMPTOMS: bowel or bladder control problems, unusual weakness or numbness in your arms or legs, nausea or vomiting, abdominal pain, fever, dizziness/lightheadedness.     Please follow up with your doctor within 48 hours.

## 2022-12-12 NOTE — ED ADULT NURSE NOTE - OBJECTIVE STATEMENT
Late NOte  27F nad aaox 3 c/o lower back pain s/p mechanical fall one month pta. Pt with even and unlabored breathing, +LOPEZ, ambulatory, full physical assessment deferred to acp/physician due to high unit census in the department. Pt denies allergies, was medicated as ordered and ambulated steadily off unit without assist when discharged.

## 2022-12-12 NOTE — ED PROVIDER NOTE - PATIENT PORTAL LINK FT
You can access the FollowMyHealth Patient Portal offered by [facility] by registering at the following website: [email or web address]. By joining [facility]’s FollowMyHealth portal, you will also be able to view your health information using other applications (apps) compatible with our system.

## 2022-12-12 NOTE — ED PROVIDER NOTE - NS ED ATTENDING STATEMENT MOD
This was a shared visit with the SHANTLEL. I reviewed and verified the documentation and independently performed the documented:

## 2022-12-13 PROBLEM — T14.8 OTHER INJURY OF UNSPECIFIED BODY REGION: Chronic | Status: ACTIVE | Noted: 2017-03-15

## 2022-12-13 PROBLEM — E66.9 OBESITY, UNSPECIFIED: Chronic | Status: ACTIVE | Noted: 2017-03-15

## 2022-12-28 NOTE — ED PROVIDER NOTE - CPE EDP SKIN NORM
The patient presents for continued care and evaluation following a robotic low anterior resection of the rectosigmoid colon for diverticulitis on December 19, 2022. Overall, the patient states she is doing well postoperatively. Her primary complaint is that she has episodes of intermittent pelvic cramping that radiates to her back. She states this typically occurs after meals and she has to walk out the pain. She states for the most part, she is taking ibuprofen and Tylenol for pain management. She states she required Norco yesterday. She is tolerating general diet. She denies nausea or vomiting. She is having regular bowel movements. She denies diarrhea or constipation. She states her bowel movements have been soft and smaller in size. She denies hematochezia, melena or mucus in her stools. The patient's drain remains in place. She states she has had approximately 55 cc serosanguineous output for the last 3 to 4 days. The patient is curious when she needs her next colonoscopy. She had her first at the age of 50. She has never had colon polyps. She has no family history of colon cancer. Clinical examination of the abdomen reveals it to be soft, nondistended, nontender, bowel sounds are normal activity normal pitch. There  is no rebounding tenderness or guarding. There are no signs of ascites or peritonitis. The liver and spleen are nonpalpable. There are no palpable masses. There are no umbilical or inguinal hernias. Transverse left abdominal incision and laparoscopic incision sites are clean, dry, intact without surrounding erythema or cellulitis. Steri-Strips are in place. I took the opportunity during today's visit to remove the patient's drain. The patient is doing well status post robotic low anterior resection of the rectosigmoid colon for diverticulitis on December 19, 2022.     I took the opportunity at this appointment to discuss the pathology with the patient which revealed diverticulitis. I discussed with the patient that they should refrain from any bending, pushing, pulling, twisting, or lifting of a force greater than 20 pounds for 8 weeks post-op. The patient should avoid submerging their incisions in a bath, hot tub, pool for a total of 2 weeks postoperatively. She was counseled and when to return to driving. All of the patient's questions were answered. The patient verbalized understanding and agreement with the plan of care. I have no further follow-up scheduled with this patient at this time. This patient can see me on an as-needed basis. This patient should return urgently for any problems or complications related to the surgical intervention. normal...

## 2023-01-09 ENCOUNTER — EMERGENCY (EMERGENCY)
Facility: HOSPITAL | Age: 28
LOS: 1 days | Discharge: HOME | End: 2023-01-09
Attending: STUDENT IN AN ORGANIZED HEALTH CARE EDUCATION/TRAINING PROGRAM
Payer: SELF-PAY

## 2023-01-09 VITALS
RESPIRATION RATE: 18 BRPM | WEIGHT: 293 LBS | HEART RATE: 67 BPM | TEMPERATURE: 98 F | SYSTOLIC BLOOD PRESSURE: 145 MMHG | DIASTOLIC BLOOD PRESSURE: 88 MMHG | HEIGHT: 66 IN | OXYGEN SATURATION: 99 %

## 2023-01-09 PROCEDURE — 73630 X-RAY EXAM OF FOOT: CPT

## 2023-01-09 PROCEDURE — 73610 X-RAY EXAM OF ANKLE: CPT

## 2023-01-09 PROCEDURE — 73630 X-RAY EXAM OF FOOT: CPT | Mod: 26,RT

## 2023-01-09 PROCEDURE — 73610 X-RAY EXAM OF ANKLE: CPT | Mod: 26,RT

## 2023-01-09 PROCEDURE — 99284 EMERGENCY DEPT VISIT MOD MDM: CPT

## 2023-01-09 PROCEDURE — 96372 THER/PROPH/DIAG INJ SC/IM: CPT

## 2023-01-09 PROCEDURE — 73590 X-RAY EXAM OF LOWER LEG: CPT

## 2023-01-09 PROCEDURE — 73590 X-RAY EXAM OF LOWER LEG: CPT | Mod: 26,RT

## 2023-01-09 RX ORDER — KETOROLAC TROMETHAMINE 30 MG/ML
15 SYRINGE (ML) INJECTION ONCE
Refills: 0 | Status: DISCONTINUED | OUTPATIENT
Start: 2023-01-09 | End: 2023-01-09

## 2023-01-09 RX ADMIN — Medication 15 MILLIGRAM(S): at 12:59
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
## 2023-01-09 NOTE — ED PROVIDER NOTE - NS ED ATTENDING STATEMENT MOD
This was a shared visit with the SHANTELL. I reviewed and verified the documentation and independently performed the documented: Attending with

## 2023-01-09 NOTE — ED PROVIDER NOTE - PHYSICAL EXAMINATION
General: well appearing, NAD  Head: NC, AT  EENT: EOMI  Cardiac: Nonpitting b/l LE edema  Respiratory: no respiratory distress   Abdomen: ND  MSK/Vascular: TTP lateral malleolus, calcaneus, and midfoot. Pain with active ROM of R ankle dorsiflexion and plantarflexion, unable to invert or terrell ankle without pain, soft compartments, warm extremities, neg Bruno test R Achilles, Equal but diminished DP pulses b/l  Neuro: AAOx3, sensation to light touch intact  Psych: calm, cooperative General: well appearing, NAD  Head: NC, AT  EENT: EOMI  Cardiac: Nonpitting b/l LE edema  Respiratory: no respiratory distress   Abdomen: ND  MSK/Vascular: TTP lateral malleolus, calcaneus, and midfoot. Pain with active ROM of R ankle dorsiflexion and plantarflexion, unable to invert or terrell ankle without pain, soft compartments, warm extremities, neg Bruno test R Achilles, Equal but diminished DP pulses b/l, able to bear weight on R ankle, gait grossly normal  Neuro: AAOx3, sensation to light touch intact  Psych: calm, cooperative
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
## 2023-01-09 NOTE — ED PROVIDER NOTE - PATIENT PORTAL LINK FT
You can access the FollowMyHealth Patient Portal offered by [facility] by registering at the following website: [email or web address]. By joining [employer]’s FollowMyHealth portal, you will also be able to view your health information using other applications (apps) compatible with our system.
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
## 2023-01-09 NOTE — ED PROVIDER NOTE - ATTENDING APP SHARED VISIT CONTRIBUTION OF CARE
VIMAL Duke: see mdm    I have personally performed a face to face diagnostic evaluation on this patient.  I have reviewed the ACP note and agree with the history, exam, and plan of care, except as noted.   My medical decision making and observations are found above.

## 2023-01-09 NOTE — ED PROVIDER NOTE - NSFOLLOWUPINSTRUCTIONS_ED_ALL_ED_FT
Ankle Sprain    Illustration of an ankle sprain caused by a foot turning outward and a foot turning inward.    An ankle sprain is a stretch or tear in one of the tough tissues (ligaments) that connect the bones in your ankle. An ankle sprain can happen when the ankle rolls outward (inversion sprain) or inward (eversion sprain).      What are the causes?    This condition is caused by rolling or twisting the ankle.      What increases the risk?    You are more likely to develop this condition if you play sports.    What are the signs or symptoms?    Symptoms of this condition include:  •Pain in your ankle.   •Swelling.   •Bruising. This may happen right after you sprain your ankle or 1–2 days later.  •Trouble standing or walking.    How is this diagnosed?    This condition is diagnosed with:  •A physical exam. During the exam, your doctor will press on certain parts of your foot and ankle and try to move them in certain ways.  •X-ray imaging. These may be taken to see how bad the sprain is and to check for broken bones.    How is this treated?    This condition may be treated with:  •A brace or splint. This is used to keep the ankle from moving until it heals.  •An elastic bandage. This is used to support the ankle.  •Crutches.  •Pain medicine.  •Surgery. This may be needed if the sprain is very bad.  •Physical therapy. This may help to improve movement in the ankle.      Follow these instructions at home:    If you have a brace or a splint:     •Wear the brace or splint as told by your doctor. Remove it only as told by your doctor.  •Loosen the brace or splint if your toes:  •Tingle.   •Lose feeling (become numb).  •Turn cold and blue.  •Keep the brace or splint clean.  •If the brace or splint is not waterproof:  •Do not let it get wet.  •Cover it with a watertight covering when you take a bath or a shower.      If you have an elastic bandage (dressing):     •Remove it to shower or bathe.   •Try not to move your ankle much, but wiggle your toes from time to time. This helps to prevent swelling.   •Adjust the dressing if it feels too tight.  •Loosen the dressing if your foot:   •Loses feeling.  •Tingles.  •Becomes cold and blue.      Managing pain, stiffness, and swelling   Bag of ice on a towel on the skin.   •Take over-the-counter and prescription medicines only as told by doctor.  •For 2–3 days, keep your ankle raised (elevated) above the level of your heart.  •If told, put ice on the injured area:  •If you have a removable brace or splint, remove it as told by your doctor.  •Put ice in a plastic bag.   •Place a towel between your skin and the bag.   •Leave the ice on for 20 minutes, 2–3 times a day.      General instructions     •Rest your ankle.  • Do not use your injured leg to support your body weight until your doctor says that you can. Use crutches as told by your doctor  • Do not use any products that contain nicotine or tobacco, such as cigarettes, e-cigarettes, and chewing tobacco. If you need help quitting, ask your doctor.  •Keep all follow-up visits as told by your doctor.      Contact a doctor if:    •Your bruises or swelling are quickly getting worse.  •Your pain does not get better after you take medicine.    Get help right away if:    •You cannot feel your toes or foot.  •Your foot or toes look blue.  •You have very bad pain that gets worse.

## 2023-02-26 ENCOUNTER — EMERGENCY (EMERGENCY)
Facility: HOSPITAL | Age: 28
LOS: 1 days | Discharge: HOME | End: 2023-02-26
Attending: STUDENT IN AN ORGANIZED HEALTH CARE EDUCATION/TRAINING PROGRAM
Payer: SELF-PAY

## 2023-02-26 VITALS
DIASTOLIC BLOOD PRESSURE: 88 MMHG | WEIGHT: 240.08 LBS | HEART RATE: 60 BPM | TEMPERATURE: 99 F | OXYGEN SATURATION: 99 % | HEIGHT: 66 IN | SYSTOLIC BLOOD PRESSURE: 136 MMHG | RESPIRATION RATE: 20 BRPM

## 2023-02-26 LAB
ALBUMIN SERPL ELPH-MCNC: 3.7 G/DL — SIGNIFICANT CHANGE UP (ref 3.3–5.2)
ALP SERPL-CCNC: 49 U/L — SIGNIFICANT CHANGE UP (ref 40–120)
ALT FLD-CCNC: 17 U/L — SIGNIFICANT CHANGE UP
ANION GAP SERPL CALC-SCNC: 13 MMOL/L — SIGNIFICANT CHANGE UP (ref 5–17)
APPEARANCE UR: CLEAR — SIGNIFICANT CHANGE UP
AST SERPL-CCNC: 14 U/L — SIGNIFICANT CHANGE UP
BASOPHILS # BLD AUTO: 0.03 K/UL — SIGNIFICANT CHANGE UP (ref 0–0.2)
BASOPHILS NFR BLD AUTO: 0.4 % — SIGNIFICANT CHANGE UP (ref 0–2)
BILIRUB SERPL-MCNC: 0.3 MG/DL — LOW (ref 0.4–2)
BILIRUB UR-MCNC: NEGATIVE — SIGNIFICANT CHANGE UP
BUN SERPL-MCNC: 11.2 MG/DL — SIGNIFICANT CHANGE UP (ref 8–20)
CALCIUM SERPL-MCNC: 8.9 MG/DL — SIGNIFICANT CHANGE UP (ref 8.4–10.5)
CHLORIDE SERPL-SCNC: 103 MMOL/L — SIGNIFICANT CHANGE UP (ref 96–108)
CO2 SERPL-SCNC: 23 MMOL/L — SIGNIFICANT CHANGE UP (ref 22–29)
COLOR SPEC: YELLOW — SIGNIFICANT CHANGE UP
CREAT SERPL-MCNC: 0.58 MG/DL — SIGNIFICANT CHANGE UP (ref 0.5–1.3)
D DIMER BLD IA.RAPID-MCNC: <150 NG/ML DDU — SIGNIFICANT CHANGE UP
DIFF PNL FLD: NEGATIVE — SIGNIFICANT CHANGE UP
EGFR: 129 ML/MIN/1.73M2 — SIGNIFICANT CHANGE UP
EOSINOPHIL # BLD AUTO: 0.3 K/UL — SIGNIFICANT CHANGE UP (ref 0–0.5)
EOSINOPHIL NFR BLD AUTO: 4.1 % — SIGNIFICANT CHANGE UP (ref 0–6)
GLUCOSE SERPL-MCNC: 86 MG/DL — SIGNIFICANT CHANGE UP (ref 70–99)
GLUCOSE UR QL: NEGATIVE MG/DL — SIGNIFICANT CHANGE UP
HCG SERPL-ACNC: <4 MIU/ML — SIGNIFICANT CHANGE UP
HCT VFR BLD CALC: 42.8 % — SIGNIFICANT CHANGE UP (ref 34.5–45)
HGB BLD-MCNC: 14.2 G/DL — SIGNIFICANT CHANGE UP (ref 11.5–15.5)
IMM GRANULOCYTES NFR BLD AUTO: 1.2 % — HIGH (ref 0–0.9)
KETONES UR-MCNC: NEGATIVE — SIGNIFICANT CHANGE UP
LEUKOCYTE ESTERASE UR-ACNC: NEGATIVE — SIGNIFICANT CHANGE UP
LIDOCAIN IGE QN: 27 U/L — SIGNIFICANT CHANGE UP (ref 22–51)
LYMPHOCYTES # BLD AUTO: 1.69 K/UL — SIGNIFICANT CHANGE UP (ref 1–3.3)
LYMPHOCYTES # BLD AUTO: 23.4 % — SIGNIFICANT CHANGE UP (ref 13–44)
MCHC RBC-ENTMCNC: 28.3 PG — SIGNIFICANT CHANGE UP (ref 27–34)
MCHC RBC-ENTMCNC: 33.2 GM/DL — SIGNIFICANT CHANGE UP (ref 32–36)
MCV RBC AUTO: 85.4 FL — SIGNIFICANT CHANGE UP (ref 80–100)
MONOCYTES # BLD AUTO: 0.56 K/UL — SIGNIFICANT CHANGE UP (ref 0–0.9)
MONOCYTES NFR BLD AUTO: 7.7 % — SIGNIFICANT CHANGE UP (ref 2–14)
NEUTROPHILS # BLD AUTO: 4.56 K/UL — SIGNIFICANT CHANGE UP (ref 1.8–7.4)
NEUTROPHILS NFR BLD AUTO: 63.2 % — SIGNIFICANT CHANGE UP (ref 43–77)
NITRITE UR-MCNC: NEGATIVE — SIGNIFICANT CHANGE UP
PH UR: 8 — SIGNIFICANT CHANGE UP (ref 5–8)
PLATELET # BLD AUTO: 250 K/UL — SIGNIFICANT CHANGE UP (ref 150–400)
POTASSIUM SERPL-MCNC: 4.4 MMOL/L — SIGNIFICANT CHANGE UP (ref 3.5–5.3)
POTASSIUM SERPL-SCNC: 4.4 MMOL/L — SIGNIFICANT CHANGE UP (ref 3.5–5.3)
PROT SERPL-MCNC: 6.5 G/DL — LOW (ref 6.6–8.7)
PROT UR-MCNC: NEGATIVE — SIGNIFICANT CHANGE UP
RAPID RVP RESULT: SIGNIFICANT CHANGE UP
RBC # BLD: 5.01 M/UL — SIGNIFICANT CHANGE UP (ref 3.8–5.2)
RBC # FLD: 13.1 % — SIGNIFICANT CHANGE UP (ref 10.3–14.5)
SARS-COV-2 RNA SPEC QL NAA+PROBE: SIGNIFICANT CHANGE UP
SODIUM SERPL-SCNC: 139 MMOL/L — SIGNIFICANT CHANGE UP (ref 135–145)
SP GR SPEC: 1.01 — SIGNIFICANT CHANGE UP (ref 1.01–1.02)
UROBILINOGEN FLD QL: NEGATIVE MG/DL — SIGNIFICANT CHANGE UP
WBC # BLD: 7.23 K/UL — SIGNIFICANT CHANGE UP (ref 3.8–10.5)
WBC # FLD AUTO: 7.23 K/UL — SIGNIFICANT CHANGE UP (ref 3.8–10.5)

## 2023-02-26 PROCEDURE — 36415 COLL VENOUS BLD VENIPUNCTURE: CPT

## 2023-02-26 PROCEDURE — 96361 HYDRATE IV INFUSION ADD-ON: CPT

## 2023-02-26 PROCEDURE — 93005 ELECTROCARDIOGRAM TRACING: CPT

## 2023-02-26 PROCEDURE — 84702 CHORIONIC GONADOTROPIN TEST: CPT

## 2023-02-26 PROCEDURE — 71046 X-RAY EXAM CHEST 2 VIEWS: CPT | Mod: 26

## 2023-02-26 PROCEDURE — 87086 URINE CULTURE/COLONY COUNT: CPT

## 2023-02-26 PROCEDURE — 71046 X-RAY EXAM CHEST 2 VIEWS: CPT

## 2023-02-26 PROCEDURE — 81003 URINALYSIS AUTO W/O SCOPE: CPT

## 2023-02-26 PROCEDURE — 80053 COMPREHEN METABOLIC PANEL: CPT

## 2023-02-26 PROCEDURE — 85379 FIBRIN DEGRADATION QUANT: CPT

## 2023-02-26 PROCEDURE — 84484 ASSAY OF TROPONIN QUANT: CPT

## 2023-02-26 PROCEDURE — 99285 EMERGENCY DEPT VISIT HI MDM: CPT

## 2023-02-26 PROCEDURE — 74177 CT ABD & PELVIS W/CONTRAST: CPT | Mod: 26,MA

## 2023-02-26 PROCEDURE — 96374 THER/PROPH/DIAG INJ IV PUSH: CPT | Mod: XU

## 2023-02-26 PROCEDURE — 0225U NFCT DS DNA&RNA 21 SARSCOV2: CPT

## 2023-02-26 PROCEDURE — 93010 ELECTROCARDIOGRAM REPORT: CPT | Mod: 76

## 2023-02-26 PROCEDURE — 74177 CT ABD & PELVIS W/CONTRAST: CPT | Mod: MA

## 2023-02-26 PROCEDURE — 83690 ASSAY OF LIPASE: CPT

## 2023-02-26 PROCEDURE — 85025 COMPLETE CBC W/AUTO DIFF WBC: CPT

## 2023-02-26 PROCEDURE — 84443 ASSAY THYROID STIM HORMONE: CPT

## 2023-02-26 PROCEDURE — 99285 EMERGENCY DEPT VISIT HI MDM: CPT | Mod: 25

## 2023-02-26 RX ORDER — SODIUM CHLORIDE 9 MG/ML
1000 INJECTION INTRAMUSCULAR; INTRAVENOUS; SUBCUTANEOUS ONCE
Refills: 0 | Status: COMPLETED | OUTPATIENT
Start: 2023-02-26 | End: 2023-02-26

## 2023-02-26 RX ORDER — ONDANSETRON 8 MG/1
1 TABLET, FILM COATED ORAL
Qty: 12 | Refills: 0
Start: 2023-02-26 | End: 2023-03-01

## 2023-02-26 RX ORDER — FAMOTIDINE 10 MG/ML
20 INJECTION INTRAVENOUS ONCE
Refills: 0 | Status: COMPLETED | OUTPATIENT
Start: 2023-02-26 | End: 2023-02-26

## 2023-02-26 RX ADMIN — FAMOTIDINE 20 MILLIGRAM(S): 10 INJECTION INTRAVENOUS at 15:35

## 2023-02-26 RX ADMIN — SODIUM CHLORIDE 1000 MILLILITER(S): 9 INJECTION INTRAMUSCULAR; INTRAVENOUS; SUBCUTANEOUS at 15:33

## 2023-02-26 RX ADMIN — Medication 100 MILLIGRAM(S): at 20:28

## 2023-02-26 NOTE — ED PROVIDER NOTE - CARE PROVIDER_API CALL
Gabe Barrios)  Cardiovascular Disease; Internal Medicine  [address] [place] [address]  Phone: [phone]  Fax: [phone]  Follow Up Time:

## 2023-02-26 NOTE — ED PROVIDER NOTE - NSFOLLOWUPINSTRUCTIONS_ED_ALL_ED_FT
Chest Pain    Chest pain can be caused by many different conditions which may or may not be dangerous. Causes include heartburn, lung infections, heart attack, blood clot in lungs, skin infections, strain or damage to muscle, cartilage, or bones, etc. In addition to a history and physical examination, an electrocardiogram (ECG) or other lab tests may have been performed to determine the cause of your chest pain. Follow up with your primary care provider or with a cardiologist as instructed.     SEEK IMMEDIATE MEDICAL CARE IF YOU HAVE ANY OF THE FOLLOWING SYMPTOMS: worsening chest pain, coughing up blood, unexplained back/neck/jaw pain, severe abdominal pain, dizziness or lightheadedness, fainting, shortness of breath, sweaty or clammy skin, vomiting, or racing heart beat. These symptoms may represent a serious problem that is an emergency. Do not wait to see if the symptoms will go away. Get medical help right away. Call 911 and do not drive yourself to the hospital.,    Abscess    An abscess is an infected area that contains a collection of pus and debris. It can occur in almost any part of the body and occurs when the tissue gets infection. Symptoms include a painful mass that is red, warm, tender that might break open and HAVE drainage. If your health care provider gave you antibiotics make sure to take the full course and do not stop even if feeling better.     SEEK IMMEDIATE MEDICAL CARE IF YOU HAVE ANY OF THE FOLLOWING SYMPTOMS: chills, fever, muscle aches, or red streaking from the area.     Nausea / Vomiting    Nausea is the feeling that you have to vomit. As nausea gets worse, it can lead to vomiting. Vomiting puts you at an increased risk for dehydration. Older adults and people with other diseases or a weak immune system are at higher risk for dehydration. Drink clear fluids in small but frequent amounts as tolerated. Eat bland, easy-to-digest foods in small amounts as tolerated.    SEEK IMMEDIATE MEDICAL CARE IF YOU HAVE ANY OF THE FOLLOWING SYMPTOMS: fever, inability to keep sufficient fluids down, black or bloody vomitus, black or bloody stools, lightheadedness/dizziness, chest pain, severe headache, rash, shortness of breath, cold or clammy skin, confusion, pain with urination, or any signs of dehydration.

## 2023-02-26 NOTE — ED ADULT TRIAGE NOTE - CHIEF COMPLAINT QUOTE
Pt A&Ox4, NAD. Pt presents to the ED with complaints of chest pain, abdominal pain and SOB x1 week. Breathing even and unlabored.

## 2023-02-26 NOTE — ED PROVIDER NOTE - NS ED ATTENDING STATEMENT MOD
This was a shared visit with the SHANTELL. I reviewed and verified the documentation and independently performed the documented:

## 2023-02-26 NOTE — ED PROVIDER NOTE - OBJECTIVE STATEMENT
28 y/o female with hx of MRSA and Asthma presents to the ED with multiple complaints. Pt notes she had back pain from a previous fall 4 months ago and felt as if her back pain was contributing to her symptoms. Notes she began to feel bilateral rib pain. For the past week pt notes she began to have substernal chest pain, describing as pressure with associated sob. Chest pain has no alleviating or exacerbating factors. Pt admits to nausea nad vomiting. Chest pain not associated with chest pain. Also admits to diarrhea. Admits to intermittent fevers. ALso notes she has an abscess of the right buttocks that she had been trying to pop herself. Concerned with hx of MRSA. No recent travels, recent surgeries, hx of blood clots, personal history of cardiac history. 26 y/o female with hx of MRSA and Asthma presents to the ED with multiple complaints. Pt notes she had back pain from a previous fall 4 months ago and felt as if her back pain was contributing to her symptoms. Notes she began to feel bilateral rib pain. For the past week pt notes she began to have substernal chest pain, describing as pressure with associated sob. Chest pain has no alleviating or exacerbating factors. Pt admits to nausea nad vomiting.. Also admits to diarrhea. Admits to intermittent fevers. ALso notes she has an abscess of the right buttocks that she had been trying to pop herself. Concerned with hx of MRSA. No recent travels, recent surgeries, hx of blood clots, personal history of cardiac history.

## 2023-02-26 NOTE — ED PROVIDER NOTE - IV ALTEPLASE DOOR HIDDEN
Done.   
Mother is requesting a Rx for Sklice for lice, she states Dr Somers has given to her kids twice before, when the OTC meds have not worked. Allergies and pharmacy reveiwed, Please advise, thanks  
show

## 2023-02-26 NOTE — ED ADULT NURSE NOTE - OBJECTIVE STATEMENT
patient c/o RUQ pain for the past 3 days, stated never having problems before any medical problems. patient c/o b/l flank pain and chest tightness. current every day smoker.

## 2023-02-26 NOTE — ED PROVIDER NOTE - SKIN, MLM
Skin normal color for race, warm, dry and intact. No evidence of rash. approx 1 x 2 cm area of fluctuance of the right medial buttocks close the cleft Skin normal color for race, warm, dry and intact. No evidence of rash. approx 1 x 2 cm area of fluctuance of the right medial gluteal cleft

## 2023-02-26 NOTE — ED ADULT NURSE REASSESSMENT NOTE - NS ED NURSE REASSESS COMMENT FT1
Patient discharged by provider prior to RN assessment. No signs of acute distress noted, respirations even and unlabored. Refer to provider notes.

## 2023-02-26 NOTE — ED PROVIDER NOTE - PATIENT PORTAL LINK FT
You can access the FollowMyHealth Patient Portal offered by [facility] by registering at the following website: [email or web address]. By joining [email or web address]’s FollowMyHealth portal, you will also be able to view your health information using other applications (apps) compatible with our system.

## 2023-02-26 NOTE — ED PROVIDER NOTE - CLINICAL SUMMARY MEDICAL DECISION MAKING FREE TEXT BOX
26 y/o female with hx of MRSA and Asthma presents to the ED with multiple complaints. chest pain x 1 with sob, no exacerbating or alleviating factors, trop x 1 chest xray clear, epigastric pain with nausea and vomiting, labs stable, ct shows no acute abnormalities, pt has a small abscess of the buttocks but declines I&D would like to go home on doxy and return if worsens. Pt with episodes of bradycardia, no hx of bradycardia, improved to 56 at this time, will have pt follow up with cardio, Pt reassessed, pt feeling better at this time, vss, pt able to walk, talk and vocalized plan of action. Discussed in depth and explained to pt in depth the next steps that need to be taking including proper follow up with PCP or specialists. All incidental findings were discussed with pt as well. Pt verbalized their concerns and all questions were answered. Pt understands dispo and wants discharge. Given good instructions when to return to ED and importance of f/u. 28 y/o female with hx of MRSA and Asthma presents to the ED with multiple complaints. chest pain x 1 with sob, no exacerbating or alleviating factors, trop x 1 chest xray clear, epigastric pain with nausea and vomiting, labs stable, ct shows no acute abnormalities, pt has a small abscess of the buttocks but declines I&D would like to go home on doxy and return if worsens. Pt with episodes of bradycardia, no hx of bradycardia, improved to 56 at this time, will have pt follow up with cardio, Pt reassessed, pt feeling better at this time, vss, pt able to walk, talk and vocalized plan of action. Discussed in depth and explained to pt in depth the next steps that need to be taking including proper follow up with PCP or specialists. All incidental findings were discussed with pt as well. Pt verbalized their concerns and all questions were answered. Pt understands dispo and wants discharge. Given good instructions when to return to ED and importance of f/u.    Khushi Preciado MD Attending Physician- Agree with above. Note has been edited to reflect my history, physical and mdm. chest pain and shortness of breath, trop negative, xray no focal findings, d-dimer negative. therefore unliekly pna, pe, ptx or esophgeal rupture contributing to pain. no concern for aortic dissection. diffuse upper abdominal/rib tenderness, considered pancreatitis vs biliary pathology vs gerd/gastritis/pud. possibly all still related to gerd/gastritis/pud as ct without acute abnormalities. patient declined I&D, no evidence of perianal/perirectal abscess extension on ct. would like to trial abx. stable for dc home with follow up at this time

## 2023-02-26 NOTE — ED ADULT NURSE NOTE - CAS ELECT INFOMATION PROVIDED
Patient discharged by provider prior to RN assessment. No signs of acute distress noted, respirations even and unlabored. Refer to provider notes./DC instructions

## 2023-02-26 NOTE — ED PROVIDER NOTE - CARE PLAN
1 Principal Discharge DX:	Chest pain  Secondary Diagnosis:	Bradycardia  Secondary Diagnosis:	Nausea and vomiting  Secondary Diagnosis:	Skin abscess

## 2023-02-27 LAB
CULTURE RESULTS: SIGNIFICANT CHANGE UP
SPECIMEN SOURCE: SIGNIFICANT CHANGE UP

## 2023-04-16 ENCOUNTER — EMERGENCY (EMERGENCY)
Facility: HOSPITAL | Age: 28
LOS: 1 days | Discharge: HOME | End: 2023-04-16
Attending: EMERGENCY MEDICINE
Payer: SELF-PAY

## 2023-04-16 VITALS
HEIGHT: 66 IN | SYSTOLIC BLOOD PRESSURE: 123 MMHG | DIASTOLIC BLOOD PRESSURE: 87 MMHG | HEART RATE: 101 BPM | RESPIRATION RATE: 20 BRPM | WEIGHT: 240.08 LBS | TEMPERATURE: 98 F | OXYGEN SATURATION: 96 %

## 2023-04-16 PROCEDURE — 70450 CT HEAD/BRAIN W/O DYE: CPT | Mod: MG

## 2023-04-16 PROCEDURE — 70450 CT HEAD/BRAIN W/O DYE: CPT | Mod: 26,MG

## 2023-04-16 PROCEDURE — G1004: CPT

## 2023-04-16 PROCEDURE — 99284 EMERGENCY DEPT VISIT MOD MDM: CPT | Mod: 25

## 2023-04-16 PROCEDURE — 12001 RPR S/N/AX/GEN/TRNK 2.5CM/<: CPT

## 2023-04-16 PROCEDURE — 73560 X-RAY EXAM OF KNEE 1 OR 2: CPT

## 2023-04-16 PROCEDURE — 96372 THER/PROPH/DIAG INJ SC/IM: CPT | Mod: XU

## 2023-04-16 PROCEDURE — 73560 X-RAY EXAM OF KNEE 1 OR 2: CPT | Mod: 26,LT

## 2023-04-16 RX ORDER — ACETAMINOPHEN 500 MG
975 TABLET ORAL ONCE
Refills: 0 | Status: COMPLETED | OUTPATIENT
Start: 2023-04-16 | End: 2023-04-16

## 2023-04-16 RX ORDER — KETOROLAC TROMETHAMINE 30 MG/ML
15 SYRINGE (ML) INJECTION ONCE
Refills: 0 | Status: DISCONTINUED | OUTPATIENT
Start: 2023-04-16 | End: 2023-04-16

## 2023-04-16 RX ADMIN — Medication 15 MILLIGRAM(S): at 22:27

## 2023-04-16 RX ADMIN — Medication 975 MILLIGRAM(S): at 18:10

## 2023-04-16 NOTE — ED PROVIDER NOTE - PATIENT PORTAL LINK FT
You can access the FollowMyHealth Patient Portal offered by [facility] by registering at the following website: [email or web address]. By joining [employer]’s FollowMyHealth portal, you will also be able to view your health information using other applications (apps) compatible with our system.

## 2023-04-16 NOTE — ED PROVIDER NOTE - PHYSICAL EXAMINATION
Constitutional: Awake, alert and oriented. In no acute distress. Well appearing.  HEENT: NC/AT. Moist mucous membranes.  Eyes: No scleral icterus. EOMI.  Neck:. Soft and supple. Full ROM without pain. No midline cervical tenderness and no step offs deformities  Cardiac: Regular rate and regular rhythm. +S1/S2. Peripheral pulses 2+ and symmetric. No LE edema.  Respiratory: Speaking in full sentences. No evidence of respiratory distress. No wheezes, rales or rhonchi.  Abdomen: Soft, non-distended and non tender Normal bowel sounds in all 4 quadrants. No guarding or rebound. no CVAT  Back:  No midline thoracic/lumbar tenderness and no step offs deformities  Skin: (+) approximately 2cm superficial laceration over posterior scalp (+) abrasion left knee  Lymph: No cervical lymphadenopathy.  Neuro: Awake, alert & oriented x 3. Moves all extremities symmetrically. Negative pronator drift, strength 5/5 all upper and lower extremities, sensation to light touch intact throughout upper/ lower extremities and face, finger to nose coordination intact, cranial nerves 2-12 intact  Psych: calm, cooperative, normal affect

## 2023-04-16 NOTE — ED PROCEDURE NOTE - ATTENDING APP SHARED VISIT CONTRIBUTION OF CARE
I, Bandar Rushing, performed the initial face to face bedside interview with this patient regarding history of present illness, review of symptoms and relevant past medical, social and family history.  I completed an independent physical examination.  I was the initial provider who evaluated this patient. I have signed out the follow up of any pending tests (i.e. labs, radiological studies) to the ACP.  I have communicated the patient’s plan of care and disposition with the ACP.
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
## 2023-04-16 NOTE — ED PROVIDER NOTE - NSFOLLOWUPINSTRUCTIONS_ED_ALL_ED_FT
1) Follow up with your PCP within 2-3 days for your condition  2) Return to the emergency room in 7-10 days for staples removal or sooner if you are experiencing any new or worsening symptoms    Laceration    A laceration is a cut that goes through all of the layers of the skin and into the tissue that is right under the skin. Some lacerations heal on their own. Others need to be closed with skin adhesive strips, skin glue, stitches (sutures), or staples. Proper laceration care minimizes the risk of infection and helps the laceration to heal better.  If non-absorbable stitches or staples have been placed, they must be taken out within the time frame instructed by your healthcare provider.    SEEK IMMEDIATE MEDICAL CARE IF YOU HAVE ANY OF THE FOLLOWING SYMPTOMS: swelling around the wound, worsening pain, drainage from the wound, red streaking going away from your wound, inability to move finger or toe near the laceration, or discoloration of skin near the laceration,  fever, vomiting, stiff neck, loss of vision, problems with speech, muscle weakness, loss of balance, trouble walking, passing out, or confusion.
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
## 2023-04-25 ENCOUNTER — EMERGENCY (EMERGENCY)
Facility: HOSPITAL | Age: 28
LOS: 1 days | Discharge: HOME | End: 2023-04-25
Attending: EMERGENCY MEDICINE
Payer: SELF-PAY

## 2023-04-25 VITALS
OXYGEN SATURATION: 96 % | HEIGHT: 66 IN | HEART RATE: 69 BPM | WEIGHT: 240.08 LBS | RESPIRATION RATE: 16 BRPM | TEMPERATURE: 98 F | SYSTOLIC BLOOD PRESSURE: 127 MMHG | DIASTOLIC BLOOD PRESSURE: 89 MMHG

## 2023-04-25 PROCEDURE — 15853 REMOVAL SUTR/STAPL XREQ ANES: CPT

## 2023-04-25 PROCEDURE — L9995: CPT

## 2023-04-25 PROCEDURE — G0463: CPT

## 2023-04-25 NOTE — ED PROVIDER NOTE - PHYSICAL EXAMINATION
Gen: Nontoxic, well appearing, in NAD.  Skin: Warm and dry as visualized.  Head: NC/AT. Wound clean, dry, intact, overlying scab.   Eyes: PERRLA. EOMI.  Neck: Supple, FROM. Trachea midline.   Resp: No distress.  Cardio: Well perfused.  Ext: No deformities. MAEx4. FROM.   Neuro: A&Ox3. Appears nonfocal.   Psych: Normal affect and mood.

## 2023-04-25 NOTE — ED PROVIDER NOTE - ATTENDING APP SHARED VISIT CONTRIBUTION OF CARE
The patient discussed with EDDIE Nobles I, Bandar Rushing, performed the initial face to face bedside interview with this patient regarding history of present illness, review of symptoms and relevant past medical, social and family history.  I completed an independent physical examination.  I was the initial provider who evaluated this patient. I have signed out the follow up of any pending tests (i.e. labs, radiological studies) to the ACP.  I have communicated the patient’s plan of care and disposition with the ACP.

## 2023-04-25 NOTE — ED PROVIDER NOTE - CARE PROVIDERS DIRECT ADDRESSES
[email or web address]

## 2023-04-25 NOTE — ED PROVIDER NOTE - PATIENT PORTAL LINK FT
You can access the FollowMyHealth Patient Portal offered by [facility] by registering at the following website: [email or web address]. By joining [email or web address]’s FollowMyHealth portal, you will also be able to view your health information using other applications (apps) compatible with our system. no
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
## 2023-04-25 NOTE — ED PROVIDER NOTE - CARE PROVIDER_API CALL
Li White (DO)  Brain Injury Medicine; PhysicalRehab Medicine  [address] [place] [address] [id number]  Phone: [phone]  Fax: [phone]  Follow Up Time:
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
## 2023-04-25 NOTE — ED PROVIDER NOTE - NSFOLLOWUPINSTRUCTIONS_ED_ALL_ED_FT
- Keep area clean using soap and water.   - Please bring all documentation from your ED visit to any related future follow up appointment.  - Please call to schedule follow up appointment with your primary care physician within 24-48 hours.  - Please seek immediate medical attention or return to the ED for any new/worsening, signs/symptoms, or concerns.    Feel better!

## 2023-06-27 ENCOUNTER — EMERGENCY (EMERGENCY)
Facility: HOSPITAL | Age: 28
LOS: 1 days | Discharge: HOME | End: 2023-06-27
Attending: EMERGENCY MEDICINE
Payer: MEDICAID

## 2023-06-27 VITALS — DIASTOLIC BLOOD PRESSURE: 85 MMHG | SYSTOLIC BLOOD PRESSURE: 125 MMHG | HEART RATE: 62 BPM

## 2023-06-27 VITALS
SYSTOLIC BLOOD PRESSURE: 117 MMHG | TEMPERATURE: 98 F | RESPIRATION RATE: 18 BRPM | OXYGEN SATURATION: 98 % | HEART RATE: 57 BPM | WEIGHT: 266.98 LBS | DIASTOLIC BLOOD PRESSURE: 80 MMHG

## 2023-06-27 LAB
ALBUMIN SERPL ELPH-MCNC: 3.8 G/DL — SIGNIFICANT CHANGE UP (ref 3.3–5.2)
ALP SERPL-CCNC: 55 U/L — SIGNIFICANT CHANGE UP (ref 40–120)
ALT FLD-CCNC: 49 U/L — HIGH
ANION GAP SERPL CALC-SCNC: 10 MMOL/L — SIGNIFICANT CHANGE UP (ref 5–17)
APPEARANCE UR: CLEAR — SIGNIFICANT CHANGE UP
AST SERPL-CCNC: 28 U/L — SIGNIFICANT CHANGE UP
BASOPHILS # BLD AUTO: 0.04 K/UL — SIGNIFICANT CHANGE UP (ref 0–0.2)
BASOPHILS NFR BLD AUTO: 0.5 % — SIGNIFICANT CHANGE UP (ref 0–2)
BILIRUB SERPL-MCNC: 0.3 MG/DL — LOW (ref 0.4–2)
BILIRUB UR-MCNC: NEGATIVE — SIGNIFICANT CHANGE UP
BUN SERPL-MCNC: 10.4 MG/DL — SIGNIFICANT CHANGE UP (ref 8–20)
CALCIUM SERPL-MCNC: 9 MG/DL — SIGNIFICANT CHANGE UP (ref 8.4–10.5)
CHLORIDE SERPL-SCNC: 103 MMOL/L — SIGNIFICANT CHANGE UP (ref 96–108)
CO2 SERPL-SCNC: 24 MMOL/L — SIGNIFICANT CHANGE UP (ref 22–29)
COLOR SPEC: YELLOW — SIGNIFICANT CHANGE UP
CREAT SERPL-MCNC: 0.56 MG/DL — SIGNIFICANT CHANGE UP (ref 0.5–1.3)
DIFF PNL FLD: NEGATIVE — SIGNIFICANT CHANGE UP
EGFR: 130 ML/MIN/1.73M2 — SIGNIFICANT CHANGE UP
EOSINOPHIL # BLD AUTO: 0.25 K/UL — SIGNIFICANT CHANGE UP (ref 0–0.5)
EOSINOPHIL NFR BLD AUTO: 3.2 % — SIGNIFICANT CHANGE UP (ref 0–6)
GLUCOSE SERPL-MCNC: 92 MG/DL — SIGNIFICANT CHANGE UP (ref 70–99)
GLUCOSE UR QL: NEGATIVE MG/DL — SIGNIFICANT CHANGE UP
HCG SERPL-ACNC: <4 MIU/ML — SIGNIFICANT CHANGE UP
HCT VFR BLD CALC: 42.8 % — SIGNIFICANT CHANGE UP (ref 34.5–45)
HGB BLD-MCNC: 14.3 G/DL — SIGNIFICANT CHANGE UP (ref 11.5–15.5)
IMM GRANULOCYTES NFR BLD AUTO: 0.5 % — SIGNIFICANT CHANGE UP (ref 0–0.9)
KETONES UR-MCNC: NEGATIVE — SIGNIFICANT CHANGE UP
LEUKOCYTE ESTERASE UR-ACNC: NEGATIVE — SIGNIFICANT CHANGE UP
LIDOCAIN IGE QN: 29 U/L — SIGNIFICANT CHANGE UP (ref 22–51)
LYMPHOCYTES # BLD AUTO: 1.99 K/UL — SIGNIFICANT CHANGE UP (ref 1–3.3)
LYMPHOCYTES # BLD AUTO: 25.4 % — SIGNIFICANT CHANGE UP (ref 13–44)
MCHC RBC-ENTMCNC: 27.9 PG — SIGNIFICANT CHANGE UP (ref 27–34)
MCHC RBC-ENTMCNC: 33.4 GM/DL — SIGNIFICANT CHANGE UP (ref 32–36)
MCV RBC AUTO: 83.4 FL — SIGNIFICANT CHANGE UP (ref 80–100)
MONOCYTES # BLD AUTO: 0.56 K/UL — SIGNIFICANT CHANGE UP (ref 0–0.9)
MONOCYTES NFR BLD AUTO: 7.1 % — SIGNIFICANT CHANGE UP (ref 2–14)
NEUTROPHILS # BLD AUTO: 4.96 K/UL — SIGNIFICANT CHANGE UP (ref 1.8–7.4)
NEUTROPHILS NFR BLD AUTO: 63.3 % — SIGNIFICANT CHANGE UP (ref 43–77)
NITRITE UR-MCNC: NEGATIVE — SIGNIFICANT CHANGE UP
PH UR: 6 — SIGNIFICANT CHANGE UP (ref 5–8)
PLATELET # BLD AUTO: 277 K/UL — SIGNIFICANT CHANGE UP (ref 150–400)
POTASSIUM SERPL-MCNC: 4.3 MMOL/L — SIGNIFICANT CHANGE UP (ref 3.5–5.3)
POTASSIUM SERPL-SCNC: 4.3 MMOL/L — SIGNIFICANT CHANGE UP (ref 3.5–5.3)
PROT SERPL-MCNC: 6.6 G/DL — SIGNIFICANT CHANGE UP (ref 6.6–8.7)
PROT UR-MCNC: NEGATIVE — SIGNIFICANT CHANGE UP
RAPID RVP RESULT: SIGNIFICANT CHANGE UP
RBC # BLD: 5.13 M/UL — SIGNIFICANT CHANGE UP (ref 3.8–5.2)
RBC # FLD: 13.1 % — SIGNIFICANT CHANGE UP (ref 10.3–14.5)
SARS-COV-2 RNA SPEC QL NAA+PROBE: SIGNIFICANT CHANGE UP
SODIUM SERPL-SCNC: 137 MMOL/L — SIGNIFICANT CHANGE UP (ref 135–145)
SP GR SPEC: 1.01 — SIGNIFICANT CHANGE UP (ref 1.01–1.02)
UROBILINOGEN FLD QL: NEGATIVE MG/DL — SIGNIFICANT CHANGE UP
WBC # BLD: 7.84 K/UL — SIGNIFICANT CHANGE UP (ref 3.8–10.5)
WBC # FLD AUTO: 7.84 K/UL — SIGNIFICANT CHANGE UP (ref 3.8–10.5)

## 2023-06-27 PROCEDURE — 96375 TX/PRO/DX INJ NEW DRUG ADDON: CPT

## 2023-06-27 PROCEDURE — 99284 EMERGENCY DEPT VISIT MOD MDM: CPT | Mod: 25

## 2023-06-27 PROCEDURE — 74177 CT ABD & PELVIS W/CONTRAST: CPT | Mod: MA

## 2023-06-27 PROCEDURE — 81003 URINALYSIS AUTO W/O SCOPE: CPT

## 2023-06-27 PROCEDURE — 36415 COLL VENOUS BLD VENIPUNCTURE: CPT

## 2023-06-27 PROCEDURE — 99285 EMERGENCY DEPT VISIT HI MDM: CPT

## 2023-06-27 PROCEDURE — 74177 CT ABD & PELVIS W/CONTRAST: CPT | Mod: 26,MA

## 2023-06-27 PROCEDURE — 83690 ASSAY OF LIPASE: CPT

## 2023-06-27 PROCEDURE — 0225U NFCT DS DNA&RNA 21 SARSCOV2: CPT

## 2023-06-27 PROCEDURE — 96374 THER/PROPH/DIAG INJ IV PUSH: CPT | Mod: XU

## 2023-06-27 PROCEDURE — 85025 COMPLETE CBC W/AUTO DIFF WBC: CPT

## 2023-06-27 PROCEDURE — 80053 COMPREHEN METABOLIC PANEL: CPT

## 2023-06-27 PROCEDURE — 84702 CHORIONIC GONADOTROPIN TEST: CPT

## 2023-06-27 RX ORDER — METOCLOPRAMIDE HCL 10 MG
10 TABLET ORAL ONCE
Refills: 0 | Status: COMPLETED | OUTPATIENT
Start: 2023-06-27 | End: 2023-06-27

## 2023-06-27 RX ORDER — ACETAMINOPHEN 500 MG
1000 TABLET ORAL ONCE
Refills: 0 | Status: COMPLETED | OUTPATIENT
Start: 2023-06-27 | End: 2023-06-27

## 2023-06-27 RX ORDER — SODIUM CHLORIDE 9 MG/ML
1000 INJECTION INTRAMUSCULAR; INTRAVENOUS; SUBCUTANEOUS ONCE
Refills: 0 | Status: COMPLETED | OUTPATIENT
Start: 2023-06-27 | End: 2023-06-27

## 2023-06-27 RX ORDER — ONDANSETRON 8 MG/1
4 TABLET, FILM COATED ORAL ONCE
Refills: 0 | Status: COMPLETED | OUTPATIENT
Start: 2023-06-27 | End: 2023-06-27

## 2023-06-27 RX ADMIN — Medication 400 MILLIGRAM(S): at 14:50

## 2023-06-27 RX ADMIN — ONDANSETRON 4 MILLIGRAM(S): 8 TABLET, FILM COATED ORAL at 14:47

## 2023-06-27 RX ADMIN — Medication 10 MILLIGRAM(S): at 15:44

## 2023-06-27 RX ADMIN — SODIUM CHLORIDE 1000 MILLILITER(S): 9 INJECTION INTRAMUSCULAR; INTRAVENOUS; SUBCUTANEOUS at 14:47

## 2023-06-27 NOTE — ED ADULT NURSE NOTE - NS ED NURSE LEVEL OF CONSCIOUSNESS AFFECT
Outpatient Speech Language Pathology   Adult Speech Language Cognitive Initial Evaluation  [facility]     Patient Name: Adela Goetz  : 1942  MRN: [id number]  Today's Date: 2019        Visit Date: 2019     RBANS: The Repeatable Battery for the Assessment of Neuropsychological Status (RBANS) assesses patient function in the areas of Immediate and Delayed memory, visuospatial/constructional skills, language and attention. It is used to detect and track neurocognitive deficits.   Index score Percentile Qualitative Description   Immediate Memory 94  Average   Visuospatial 87  Low Average   Language 96  Low Average   Attention 88  Low Average   Delayed Memory 101  Average   Total Scale 90  Average   Comments:  RBANS given due to concerns for Memory.  Patient scored WFL in all areas of memory.  Gave patient handout on tips to help at home.  Instructed her to contact MD if symptoms worsen or other concerns arise.  SLP services not warranted at this time.   Lucille Mirza, MS CCC-SLP 2019 1:31 PM      Patient Active Problem List   Diagnosis   • CAD (coronary artery disease)   • Hypertension   • Hyperlipidemia   • Cerebrovascular accident (CVA) due to thrombosis of left middle cerebral artery (CMS/HCC)   • Rheumatoid arthritis (CMS/HCC)   • Osteopenia   • Meningioma (CMS/HCC)   • Altered taste   • Cervical radiculopathy   • Pneumonia of left upper lobe due to infectious organism (CMS/HCC)   • Basal cell carcinoma   • Coronary artery disease involving coronary bypass graft of native heart without angina pectoris   • Non-smoker   • BMI 23.0-23.9, adult   • Impaired memory        Past Medical History:   Diagnosis Date   • Basal cell carcinoma 10/28/2017   • CAD (coronary artery disease)    • CVA (cerebral vascular accident) (CMS/HCC)    • Hyperlipidemia    • Hypertension    • Osteopenia    • Rheumatoid arthritis (CMS/HCC)    • TIA (transient ischemic attack)         Past Surgical History:   Procedure  Laterality Date   • APPENDECTOMY     • BREAST BIOPSY     • BREAST RECONSTRUCTION      breast reduction , yrs ago   • CARDIAC CATHETERIZATION     • CARDIAC SURGERY      cabg x3  2015 ,[facility] at [facility]   • CATARACT EXTRACTION     • COLONOSCOPY N/A 1/18/2017    Procedure: COLONOSCOPY WITH ANESTHESIA;  Surgeon: Andrea Pizano DO;  Location: [facility] ENDOSCOPY;  Service:    • COLONOSCOPY N/A 2/7/2017    Procedure: COLONOSCOPY WITH ANESTHESIA;  Surgeon: Andrea Pizano DO;  Location: [facility] ENDOSCOPY;  Service:    • HEAD/NECK LESION/CYST EXCISION Right 10/18/2017    Procedure: Excision of basal cell carcinoma the right neck with complex closure;  Surgeon: Edward Cardona MD;  Location: [facility] OR;  Service:    • HERNIA REPAIR     • REDUCTION MAMMAPLASTY  1995   • RETINAL LASER PROCEDURE     • SPINE SURGERY     • TUBAL ABDOMINAL LIGATION           Visit Dx:    ICD-10-CM ICD-9-CM   1. Memory changes R41.3 780.93           SLP SLC Evaluation - 02/27/19 1100        Communication Assessment/Intervention    Document Type  evaluation   -KW    Subjective Information  no complaints   -KW    Patient Observations  alert;cooperative   -KW    Patient Effort  excellent   -KW       General Information    Patient Profile Reviewed  yes   -KW    Pertinent History Of Current Problem  Reports some memory deficits.   -KW    Precautions/Limitations, Vision  WFL   -KW    Precautions/Limitations, Hearing  WFL   -KW    Prior Level of Function-Communication  WFL   -KW    Plans/Goals Discussed with  patient   -KW    Barriers to Rehab  none identified   -KW    Patient's Goals for Discharge  return to home   -KW    Standardized Assessment Used  RBANS   -KW       Pain Assessment    Additional Documentation  Pain Scale: FACES Pre/Post-Treatment (Group)   -KW       Pain Scale: FACES Pre/Post-Treatment    Pain: FACES Scale, Pretreatment  0-->no hurt   -KW    Pain: FACES Scale, Post-Treatment  0-->no hurt   -KW       Cognitive Assessment  Intervention- SLP    Cognitive Function (Cognition)  WFL   -KW    Orientation Status (Cognition)  WFL   -KW    Memory (Cognitive)  WFL   -KW    Attention (Cognitive)  WFL   -KW    Thought Organization (Cognitive)  WFL   -KW    Reasoning (Cognitive)  WFL   -KW    Problem Solving (Cognitive)  WFL   -KW       Standardized Tests    Cognitive/Memory Tests  RBANS: Repeatable Battery for the Assessment of Neuropsychological Status   -KW       RBANS- Repeatable Battery for the Assessment of Neuropsychological Status    Immediate Memory Index Score  94   -KW    Immediate Memory Qualitative Description  average   -KW    Visuospatial Index Score  87   -KW    Visuospatial Qualitative Description  low average   -KW    Language Index Score  96   -KW    Language Qualitative Description  average   -KW    Attention Index Score  88   -KW    Attention Qualitative Description  low average   -KW    Delayed Memory Index Score  101   -KW    Delayed Memory Qualitative Description  average   -KW    Total Index Score  466   -KW    Total Qualitative Description  average   -KW       SLP Clinical Impressions    SLP Diagnosis  WFL   -KW    SLC Criteria for Skilled Therapy Interventions Met  no problems identified which require skilled intervention   -KW    Functional Impact  no impact on function   -KW       Recommendations    Therapy Frequency (SLP SLC)  evaluation only   -KW      User Key  (r) = Recorded By, (t) = Taken By, (c) = Cosigned By    Initials Name Provider Type    Lucille Hill MS CCC-SLP Speech and Language Pathologist                         OP SLP Education     Row Name 02/27/19 2110       Education    Barriers to Learning  No barriers identified  -KW    Education Provided  Described results of evaluation  -KW    Assessed  Learning needs;Learning motivation;Learning preferences;Learning readiness  -KW    Learning Motivation  Strong  -KW    Learning Method  Explanation  -KW    Teaching Response  Verbalized understanding  -KW     Education Comments  Gave patient handout on memory tips for home  -KW      User Key  (r) = Recorded By, (t) = Taken By, (c) = Cosigned By    Initials Name Effective Dates    Lucille Hill MS CCC-SLP 08/02/16 -               OP SLP Assessment/Plan - 02/27/19 1324        SLP Assessment    Functional Problems Comment  No problems identified   -KW    Please refer to paper survey for additional self-reported information  Yes   -KW    Please refer to items scanned into chart for additional diagnostic informaiton and handouts as provided by clinician  Yes   -KW    SLP Diagnosis  WFL   -KW    Patient would benefit from skilled therapy intervention  No   -KW       SLP Plan    Plan Comments  No therapy warranted   -KW      User Key  (r) = Recorded By, (t) = Taken By, (c) = Cosigned By    Initials Name Provider Type    Lucille Hill MS CCC-SLP Speech and Language Pathologist                 Time Calculation:   SLP Start Time: 1100  SLP Stop Time: 1230  SLP Time Calculation (min): 90 min    Therapy Charges for Today     Code Description Service Date Service Provider Modifiers Qty    [id number] HC ST STD COG PERF TEST PER HOUR 2/27/2019 Lucille Mirza MS CCC-SLP GN 2                   Lucille Mirza MS CCC-SLP  2/27/2019   Calm/Appropriate

## 2023-06-27 NOTE — ED ADULT TRIAGE NOTE - CHIEF COMPLAINT QUOTE
pt c/o chest pain, +vomiting, +diarrhea and blood in stool ,+headache, +body aches, pt states she has multiple abscess in butt hole hx MRSA in the butt denies any alcohol and drugs

## 2023-06-27 NOTE — ED STATDOCS - GASTROINTESTINAL, MLM
abdomen soft, non-tender, and non-distended. Bowel sounds present. (+) LLQ and RLQ and suprapubic tenderness on palpation, abdomen soft and non-distended

## 2023-06-27 NOTE — ED STATDOCS - NSFOLLOWUPINSTRUCTIONS_ED_ALL_ED_FT
- Please follow-up with your primary care doctor in the next 5-7 days.  Please call tomorrow for an appointment.  If you cannot follow-up with your primary care doctor please return to the ED for any urgent issues.  - You were given a copy of the tests performed today.  Please bring the results with you and review them with your primary care doctor.  - If you have any worsening of symptoms or any other concerns please return to the ED immediately.  - Please continue taking your home medications as directed.

## 2023-06-27 NOTE — ED STATDOCS - PATIENT PORTAL LINK FT
You can access the FollowMyHealth Patient Portal offered by [facility] by registering at the following website: [email or web address]. By joining [employer]’s FollowMyHealth portal, you will also be able to view your health information using other applications (apps) compatible with our system.

## 2023-06-27 NOTE — ED ADULT TRIAGE NOTE - DIRECT TO ROOM CARE INITIATED:
Problem: Prexisting or High Potential for Compromised Skin Integrity  Goal: Skin integrity is maintained or improved  Description: INTERVENTIONS:  - Identify patients at risk for skin breakdown  - Assess and monitor skin integrity  - Assess and monitor nutrition and hydration status  - Monitor labs   - Assess for incontinence   - Turn and reposition patient  - Assist with mobility/ambulation  - Relieve pressure over bony prominences  - Avoid friction and shearing  - Provide appropriate hygiene as needed including keeping skin clean and dry  - Evaluate need for skin moisturizer/barrier cream  - Collaborate with interdisciplinary team   - Patient/family teaching  - Consider wound care consult   Outcome: Progressing     Problem: PAIN - ADULT  Goal: Verbalizes/displays adequate comfort level or baseline comfort level  Description: Interventions:  - Encourage patient to monitor pain and request assistance  - Assess pain using appropriate pain scale  - Administer analgesics based on type and severity of pain and evaluate response  - Implement non-pharmacological measures as appropriate and evaluate response  - Consider cultural and social influences on pain and pain management  - Notify physician/advanced practitioner if interventions unsuccessful or patient reports new pain  Outcome: Progressing     Problem: INFECTION - ADULT  Goal: Absence or prevention of progression during hospitalization  Description: INTERVENTIONS:  - Assess and monitor for signs and symptoms of infection  - Monitor lab/diagnostic results  - Monitor all insertion sites, i e  indwelling lines, tubes, and drains  - Monitor endotracheal if appropriate and nasal secretions for changes in amount and color  - [place] appropriate cooling/warming therapies per order  - Administer medications as ordered  - Instruct and encourage patient and family to use good hand hygiene technique  - Identify and instruct in appropriate isolation precautions for identified infection/condition  Outcome: Progressing     Problem: SAFETY ADULT  Goal: Patient will remain free of falls  Description: INTERVENTIONS:  - Educate patient/family on patient safety including physical limitations  - Instruct patient to call for assistance with activity   - Consult OT/PT to assist with strengthening/mobility   - Keep Call bell within reach  - Keep bed low and locked with side rails adjusted as appropriate  - Keep care items and personal belongings within reach  - Initiate and maintain comfort rounds  - Make Fall Risk Sign visible to staff  - Offer Toileting in advance of need  - Initiate/Maintain bed alarm  - Obtain necessary fall risk management equipment:   - Apply yellow socks and bracelet for high fall risk patients  - Consider moving patient to room near nurses station  Outcome: Progressing  Goal: Maintain or return to baseline ADL function  Description: INTERVENTIONS:  -  Assess patient's ability to carry out ADLs; assess patient's baseline for ADL function and identify physical deficits which impact ability to perform ADLs (bathing, care of mouth/teeth, toileting, grooming, dressing, etc )  - Assess/evaluate cause of self-care deficits   - Assess range of motion  - Assess patient's mobility; develop plan if impaired  - Assess patient's need for assistive devices and provide as appropriate  - Encourage maximum independence but intervene and supervise when necessary  - Involve family in performance of ADLs  - Assess for home care needs following discharge   - Consider OT consult to assist with ADL evaluation and planning for discharge  - Provide patient education as appropriate  Outcome: Progressing  Goal: Maintains/Returns to pre admission functional level  Description: INTERVENTIONS:  - Perform BMAT or MOVE assessment daily    - Set and communicate daily mobility goal to care team and patient/family/caregiver     - Collaborate with rehabilitation services on mobility goals if consulted  - Record patient progress and toleration of activity level   Outcome: Progressing     Problem: DISCHARGE PLANNING  Goal: Discharge to home or other facility with appropriate resources  Description: INTERVENTIONS:  - Identify barriers to discharge w/patient and caregiver  - Arrange for needed discharge resources and transportation as appropriate  - Identify discharge learning needs (meds, wound care, etc )  - Refer to Case Management Department for coordinating discharge planning if the patient needs post-hospital services based on physician/advanced practitioner order or complex needs related to functional status, cognitive ability, or social support system  Outcome: Progressing     Problem: Knowledge Deficit  Goal: Patient/family/caregiver demonstrates understanding of disease process, treatment plan, medications, and discharge instructions  Description: Complete learning assessment and assess knowledge base    Interventions:  - Provide teaching at level of understanding  - Provide teaching via preferred learning methods  Outcome: Progressing     Problem: GASTROINTESTINAL - ADULT  Goal: Minimal or absence of nausea and/or vomiting  Description: INTERVENTIONS:  - Administer IV fluids if ordered to ensure adequate hydration  - Maintain NPO status until nausea and vomiting are resolved  - Nasogastric tube if ordered  - Administer ordered antiemetic medications as needed  - Provide nonpharmacologic comfort measures as appropriate  - Advance diet as tolerated, if ordered  - Consider nutrition services referral to assist patient with adequate nutrition and appropriate food choices  Outcome: Progressing  Goal: Maintains or returns to baseline bowel function  Description: INTERVENTIONS:  - Assess bowel function  - Encourage oral fluids to ensure adequate hydration  - Administer IV fluids if ordered to ensure adequate hydration  - Administer ordered medications as needed  - Encourage mobilization and activity  - Consider nutritional services referral to assist patient with adequate nutrition and appropriate food choices  Outcome: Progressing  Goal: Maintains adequate nutritional intake  Description: INTERVENTIONS:  - Monitor percentage of each meal consumed  - Identify factors contributing to decreased intake, treat as appropriate  - Assist with meals as needed  - Monitor I&O, weight, and lab values if indicated  - Obtain nutrition services referral as needed  Outcome: Progressing  Goal: Establish and maintain optimal ostomy function  Description: INTERVENTIONS:  - Assess bowel function  - Encourage oral fluids to ensure adequate hydration  - Administer IV fluids if ordered to ensure adequate hydration   - Administer ordered medications as needed  - Encourage mobilization and activity  - Nutrition services referral to assist patient with appropriate food choices  - Assess stoma site  - Consider wound care consult   Outcome: Progressing  Goal: Oral mucous membranes remain intact  Description: INTERVENTIONS  - Assess oral mucosa and hygiene practices  - Implement preventative oral hygiene regimen  - Implement oral medicated treatments as ordered  - Initiate Nutrition services referral as needed  Outcome: Progressing     Problem: METABOLIC, FLUID AND ELECTROLYTES - ADULT  Goal: Electrolytes maintained within normal limits  Description: INTERVENTIONS:  - Monitor labs and assess patient for signs and symptoms of electrolyte imbalances  - Administer electrolyte replacement as ordered  - Monitor response to electrolyte replacements, including repeat lab results as appropriate  - Instruct patient on fluid and nutrition as appropriate  Outcome: Progressing  Goal: Fluid balance maintained  Description: INTERVENTIONS:  - Monitor labs   - Monitor I/O and WT  - Instruct patient on fluid and nutrition as appropriate  - Assess for signs & symptoms of volume excess or deficit  Outcome: Progressing  Goal: Glucose maintained within target range  Description: INTERVENTIONS:  - Monitor Blood Glucose as ordered  - Assess for signs and symptoms of hyperglycemia and hypoglycemia  - Administer ordered medications to maintain glucose within target range  - Assess nutritional intake and initiate nutrition service referral as needed  Outcome: Progressing     Problem: SKIN/TISSUE INTEGRITY - ADULT  Goal: Skin Integrity remains intact(Skin Breakdown Prevention)  Description: Assess:  -Perform Gucci assessment every shift  -Clean and moisturize skin  -Inspect skin when repositioning, toileting, and assisting with ADLS  -Assess under medical devices   -Assess extremities for adequate circulation and sensation     Bed Management:  -Have minimal linens on bed & keep smooth, unwrinkled  -Change linens as needed when moist or perspiring  -Avoid sitting or lying in one position while in bed    Toileting:  -Offer bedside commode  -Assess for incontinence   -Use incontinent care products after each incontinent episode     Activity:  -Encourage or provide ROM exercises   -Turn and reposition patient every two Hours  -Use appropriate equipment to lift or move patient in bed  -Instruct/ Assist with weight shifting when out of bed in chair  -Consider limitation of chair time     Skin Care:  -Avoid use of baby powder, tape, friction and shearing, hot water or constrictive clothing  -Relieve pressure over bony prominences  -Do not massage red bony areas    Next Steps:  -Teach patient strategies to minimize risks   -Consider consults to  interdisciplinary teams   Outcome: Progressing  Goal: Incision(s), wounds(s) or drain site(s) healing without S/S of infection  Description: INTERVENTIONS  - Assess and document dressing, incision, wound bed, drain sites and surrounding tissue  - Provide patient and family education  - Perform skin care/dressing changes per order  Outcome: Progressing  Goal: Pressure injury heals and does not worsen  Description: Interventions:  - Implement low air loss mattress or specialty surface (Criteria met)  - Apply silicone foam dressing  - Instruct/assist with weight shifting   - Use special pressure reducing interventions   - Apply fecal or urinary incontinence containment device   - Perform passive or active ROM  - Turn and reposition patient & offload bony prominences    - Utilize friction reducing device or surface for transfers   - Consider consults to  interdisciplinary teams  - Use incontinent care products after each incontinent episode   - Consider nutrition services referral as needed  Outcome: Progressing     Problem: MUSCULOSKELETAL - ADULT  Goal: Maintain or return mobility to safest level of function  Description: INTERVENTIONS:  - Assess patient's ability to carry out ADLs; assess patient's baseline for ADL function and identify physical deficits which impact ability to perform ADLs (bathing, care of mouth/teeth, toileting, grooming, dressing, etc )  - Assess/evaluate cause of self-care deficits   - Assess range of motion  - Assess patient's mobility  - Assess patient's need for assistive devices and provide as appropriate  - Encourage maximum independence but intervene and supervise when necessary  - Involve family in performance of ADLs  - Assess for home care needs following discharge   - Consider OT consult to assist with ADL evaluation and planning for discharge  - Provide patient education as appropriate  Outcome: Progressing  Goal: Maintain proper alignment of affected body part  Description: INTERVENTIONS:  - Support, maintain and protect limb and body alignment  - Provide patient/ family with appropriate education  Outcome: Progressing     Problem: MOBILITY - ADULT  Goal: Maintain or return to baseline ADL function  Description: INTERVENTIONS:  -  Assess patient's ability to carry out ADLs; assess patient's baseline for ADL function and identify physical deficits which impact ability to perform ADLs (bathing, care of mouth/teeth, 27-Jun-2023 13:51 toileting, grooming, dressing, etc )  - Assess/evaluate cause of self-care deficits   - Assess range of motion  - Assess patient's mobility; develop plan if impaired  - Assess patient's need for assistive devices and provide as appropriate  - Encourage maximum independence but intervene and supervise when necessary  - Involve family in performance of ADLs  - Assess for home care needs following discharge   - Consider OT consult to assist with ADL evaluation and planning for discharge  - Provide patient education as appropriate  Outcome: Progressing  Goal: Maintains/Returns to pre admission functional level  Description: INTERVENTIONS:  - Perform BMAT or MOVE assessment daily    - Set and communicate daily mobility goal to care team and patient/family/caregiver     - Collaborate with rehabilitation services on mobility goals if consulted  - Record patient progress and toleration of activity level   Outcome: Progressing

## 2023-06-27 NOTE — ED STATDOCS - ENMT, MLM
moist mucus membranes, normal cephalic, neck supple (+) Dry oral mucosa, normal cephalic, neck supple

## 2023-06-27 NOTE — ED STATDOCS - OBJECTIVE STATEMENT
26 y/o female with no pertinent PMHx presents with 28 y/o female with no pertinent PMHx presents with vomiting, chest pain, headaches, body aches, abdominal pain, diarrhea for 4 days. Noticed blood in stool today. Reports being admitted to [facility] with MRSA in the past. Reports recurrent perianal abscesses for 1 month. Patient is unsure when LMP was, and has history of irregular menses. Reports possibility of pregnancy. Patient is also concerned for covid-19

## 2023-06-27 NOTE — ED STATDOCS - SKIN, MLM
No abscesses or skin changes on buttock. Skin normal color for race, warm, dry and intact. Chaperoned by: Jahaira Gutiérrez.

## 2023-06-27 NOTE — ED STATDOCS - PROGRESS NOTE DETAILS
PT evaluated by intake physician. HPI/PE/ROS as noted above. Will follow up plan per intake physician Labs explained to pt. Feeling better at this time. Pending CT Pt reassessed, pt feeling better at this time, vss, pt able to walk, talk and vocalized plan of action. Discussed in depth and explained to pt in depth the next steps that need to be taking including proper follow up with PCP or specialists. All incidental findings were discussed with pt as well. Pt verbalized their concerns and all questions were answered. Pt understands dispo and wants discharge. Given good instructions when to return to ED, strict return precautions and importance of f/u.

## 2023-06-27 NOTE — ED ADULT NURSE NOTE - BEFAST ARM NUMBNESS
60yo M w/pmhx of HTN, ESRD s/p renal transplant x 2 but now requiring HD again (T Th Sat), now presenting with accelerated hypertension with hypertensive emergency (w/visual changes).      Problem/Plan - 1:  ·  Problem: Visual changes.  Plan: Ophthalmology fu  BP control     Problem/Plan - 2:  ·  Problem: Accelerated hypertension.  Plan: management as per cards  improving  renal fu    Problem/Plan - 3:  ·  Problem: ESRD (end stage renal disease) on dialysis.  Plan: s/p HD today  Renal consult appreciated    Problem/Plan - 4:  ·  Problem: Renal transplant recipient.  Plan: tacrolimus 1mg BID  Prednisone 5mg po daily.   renal fu No

## 2023-06-27 NOTE — ED STATDOCS - CLINICAL SUMMARY MEDICAL DECISION MAKING FREE TEXT BOX
26 y/o female patient with 26 y/o female patient with abdominal pain, vomiting, diarrhea, chest pain, body aches, headaches. Likely viral illness. Will check labs, urine, CT, treat with IV meds and IV fluids to further evaluate patient's condition 26 y/o female patient with abdominal pain, vomiting, diarrhea, chest pain, body aches, headaches. Likely viral illness. Will check labs, urine, CT, treat with IV meds and IV fluids to further evaluate patient's condition    CT negative for acute pathology. abd exam benign. strict return precautions explained. tolerating PO well.

## 2023-06-27 NOTE — ED STATDOCS - ATTENDING APP SHARED VISIT CONTRIBUTION OF CARE
I, Gracy Jackson, performed the initial face to face bedside interview with this patient regarding history of present illness, review of symptoms and relevant past medical, social and family history.  I completed an independent physical examination.  I was the initial provider who evaluated this patient. I have signed out the follow up of any pending tests (i.e. labs, radiological studies) to the ACP.  I have communicated the patient’s plan of care and disposition with the ACP.  The history, relevant review of systems, past medical and surgical history, medical decision making, and physical examination was documented by the scribe in my presence and I attest to the accuracy of the documentation.

## 2023-06-27 NOTE — ED STATDOCS - CARE PROVIDER_API CALL
Roxy Moore  Gastroenterology  [address], [address]  [place] [address]  Phone: [phone]  Fax: [phone]  Follow Up Time:

## 2023-06-27 NOTE — ED ADULT NURSE NOTE - OBJECTIVE STATEMENT
PT presents to ED for generalized malaise body aches headaches subjective fevers and chills, nausea and vomiting x5 days. PT denies taking medication PTA. Denies sick contacts.  PT does not have PCP.  IV placed labs drawn and pt medicated per orders. Pending results.

## 2023-07-31 ENCOUNTER — NON-APPOINTMENT (OUTPATIENT)
Age: 28
End: 2023-07-31

## 2023-08-01 ENCOUNTER — APPOINTMENT (OUTPATIENT)
Dept: FAMILY MEDICINE | Facility: CLINIC | Age: 28
End: 2023-08-01
Payer: MEDICAID

## 2023-08-01 VITALS
TEMPERATURE: 98 F | SYSTOLIC BLOOD PRESSURE: 124 MMHG | OXYGEN SATURATION: 98 % | HEART RATE: 55 BPM | DIASTOLIC BLOOD PRESSURE: 80 MMHG | HEIGHT: 66 IN | BODY MASS INDEX: 45.48 KG/M2 | WEIGHT: 283 LBS

## 2023-08-01 DIAGNOSIS — L02.31 CUTANEOUS ABSCESS OF BUTTOCK: ICD-10-CM

## 2023-08-01 DIAGNOSIS — M25.50 PAIN IN UNSPECIFIED JOINT: ICD-10-CM

## 2023-08-01 DIAGNOSIS — N92.6 IRREGULAR MENSTRUATION, UNSPECIFIED: ICD-10-CM

## 2023-08-01 DIAGNOSIS — M54.50 LOW BACK PAIN, UNSPECIFIED: ICD-10-CM

## 2023-08-01 DIAGNOSIS — L02.91 CUTANEOUS ABSCESS, UNSPECIFIED: ICD-10-CM

## 2023-08-01 PROCEDURE — 99204 OFFICE O/P NEW MOD 45 MIN: CPT
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
## 2023-08-02 ENCOUNTER — APPOINTMENT (OUTPATIENT)
Dept: ORTHOPEDIC SURGERY | Facility: CLINIC | Age: 28
End: 2023-08-02
Payer: MEDICAID

## 2023-08-02 VITALS — WEIGHT: 250 LBS | BODY MASS INDEX: 40.18 KG/M2 | HEIGHT: 66 IN

## 2023-08-02 DIAGNOSIS — M79.672 PAIN IN RIGHT FOOT: ICD-10-CM

## 2023-08-02 DIAGNOSIS — M25.562 PAIN IN RIGHT KNEE: ICD-10-CM

## 2023-08-02 DIAGNOSIS — M72.2 PLANTAR FASCIAL FIBROMATOSIS: ICD-10-CM

## 2023-08-02 DIAGNOSIS — M25.571 PAIN IN RIGHT ANKLE AND JOINTS OF RIGHT FOOT: ICD-10-CM

## 2023-08-02 DIAGNOSIS — M76.62 ACHILLES TENDINITIS, RIGHT LEG: ICD-10-CM

## 2023-08-02 DIAGNOSIS — M76.61 ACHILLES TENDINITIS, RIGHT LEG: ICD-10-CM

## 2023-08-02 DIAGNOSIS — M25.561 PAIN IN RIGHT KNEE: ICD-10-CM

## 2023-08-02 DIAGNOSIS — M25.572 PAIN IN RIGHT ANKLE AND JOINTS OF RIGHT FOOT: ICD-10-CM

## 2023-08-02 DIAGNOSIS — M79.671 PAIN IN RIGHT FOOT: ICD-10-CM

## 2023-08-02 PROCEDURE — 73600 X-RAY EXAM OF ANKLE: CPT | Mod: 50

## 2023-08-02 PROCEDURE — 99204 OFFICE O/P NEW MOD 45 MIN: CPT

## 2023-08-02 PROCEDURE — 73630 X-RAY EXAM OF FOOT: CPT | Mod: 50

## 2023-08-02 PROCEDURE — 73562 X-RAY EXAM OF KNEE 3: CPT | Mod: 50

## 2023-08-02 NOTE — DISCUSSION/SUMMARY
[de-identified] : Assessment: Bilateral ankle Achilles tendinosis.  Bilateral plantar fasciitis.  Plan: #1. Anti-inflammatories/Tylenol as needed for pain. #2. Home stretching program/physical therapy prescription given. #3. Dr. Lee's insoles/gel heel cups. #4. Epsom Salt Baths daily. #5. Dorsal Night Splint.  Use as instructed/as directed.  #6. Follow up in 2-3  months.  #7. All questions answered.  The patient understood the treatment plan above.   I encouraged the patient to quit smoking and educated them about the risks that smoking has on bone healing. I also encouraged the patient that weight loss will have a great effect on her symptoms.   *Referral given for her knees to see a sports medicine knee specialist.

## 2023-08-02 NOTE — REASON FOR VISIT
[Initial Visit] : an initial visit for [FreeTextEntry2] : Bilateral ankle/foot pain.  Bilateral knee pain.
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
## 2023-08-02 NOTE — ADDENDUM
[FreeTextEntry1] : I,  FAROOQ, acted solely as a scribe for Dr. Brannon Jesus on this date 08/02/2023  .   All medical record entries made by the Scribe were at my, Dr. Brannon Jesus, direction and personally dictated by me on 08/02/2023 . I have reviewed the chart and agree that the record accurately reflects my personal performance of the history, physical exam, assessment and plan. I have also personally directed, reviewed, and agreed with the chart.

## 2023-08-02 NOTE — PHYSICAL EXAM
[de-identified] : Bilateral knee Physical Examination:  Approximate 9 cm healed laceration right knee.  General: Alert and oriented x3.  In no acute distress.  Pleasant in nature with a normal affect.  No apparent respiratory distress.   Erythema, Warmth, Rubor: Negative Swelling/Edema: Negative ROM: 0-130 degrees Blayne's Test: Positive Medial Joint Line TTP: Positive Lateral Joint Line TTP: Negative  Lachman Exam/Anterior Drawer/Pivot Shift Test: Negative  MCL Pain: Negative LCL Pain: Negative Iliotibial Band Pain: Negative Patellofemoral Joint Pain: Positive Patellar Tendon TTP: Negative Pes Anserinus TTP: Negative Homans Sign: Negative Posterior Knee Pain: Negative Neuro: Intact with no sensory or motor deficits   Bilateral ankle Physical Examination:  General: Alert and oriented x3.  In no acute distress.  Pleasant in nature with a normal affect.  No apparent respiratory distress.  Erythema, Warmth, Rubor: Negative Swelling: Negative  ROM: 1. Dorsiflexion: 10 degrees 2. Plantarflexion: 40 degrees 3. Inversion: 30 degrees 4. Eversion: 20 degrees  Tenderness to Palpation:  1. Lateral Malleolus: Negative 2. Medial Malleolus: Negative 3. Proximal Fibular Pain: Negative 4. Heel Pain: Negative 5. Cuboid: Negative 6. Navicular: Negative 7. Tibiotalar Joint: Negative 8. Subtalar Joint: Negative 9. Posterior Recess: Negative  Tendon Pain: 1. Achilles: Positive bilaterally, negative Bruno test. 2. Peroneals: Negative 3. Posterior Tibialis: Negative 4. Tibialis Anterior: Negative  Ligament Pain: 1. ATFL: Negative 2. CFL: Negative  3. PTFL: Negative 4. Deltoid Ligaments: Negative 5. Lis Franc Ligament: Negative  Stability:  1. Anterior Drawer: Negative 2. Posterior Drawer: Negative  Strength: 5/5 TA/GS/EHL  Pulses: 2+ DP/PT Pulses  Neuro: Intact motor and sensory  Additional Test: 1. Calcaneal Squeeze Test: Negative 2. Syndesmosis Squeeze Test: Negative    Bilateral foot Physical Examination:  General: Alert and oriented x3.  In no acute distress.  Pleasant in nature with a normal affect.  No apparent respiratory distress.  Erythema, Warmth, Rubor: Negative Swelling: Negative  ROM of digits: Normal  Pes Planus: Negative Pes Cavus: Negative  Bunion: Negative Tailor's Bunion (Bunionette): Negative Hammer Toe Deformity/Deformities: Negative  Tenderness to Palpation:  1. Heel Pain: Negative 2. Midfoot Pain: Negative 3. First MTP Joint: Negative 4. Lis Franc Joint: Negative  Tenderness Metatarsals: 1st MT: Negative 2nd MT: Negative 3rd MT: Negative 4th MT: Negative 5th MT: Negative Base of the 5th MT: Negative  Ligament Pain: 1. Lis Franc Ligament: Negative 2. Plantar Fascia Ligament: Positive bilaterally  Strength:  5/5 TA/GS/EHL/FHL/EDL/ADD/ABD  Pulses: 2+ DP/PT Pulses  Capillary Refill Toes: <2 seconds  Neuro: Intact motor and sensory throughout  Additional Test: 1. Desai's Squeeze Test: Negative 2. Calcaneal Squeeze Test: Negative [de-identified] : A total of 6 views bilateral feet and ankle x-rays reviewed in the office, 8/2/2023: No fractures present.  A total of 6 views bilateral knees x-rays reviewed in the office, 8/2/2023: Osteoarthritis present.  No fracture seen.

## 2023-08-10 ENCOUNTER — OFFICE (OUTPATIENT)
Dept: URBAN - METROPOLITAN AREA CLINIC 115 | Facility: CLINIC | Age: 28
Setting detail: OPHTHALMOLOGY
End: 2023-08-10
Payer: COMMERCIAL

## 2023-08-10 DIAGNOSIS — L25.9: ICD-10-CM

## 2023-08-10 PROCEDURE — 99202 OFFICE O/P NEW SF 15 MIN: CPT | Performed by: OPTOMETRIST

## 2023-08-10 ASSESSMENT — CONFRONTATIONAL VISUAL FIELD TEST (CVF)
OD_FINDINGS: FULL
OS_FINDINGS: FULL

## 2023-08-11 ASSESSMENT — REFRACTION_CURRENTRX
OD_VPRISM_DIRECTION: SV
OD_SPHERE: +1.00
OS_AXIS: 096
OS_VPRISM_DIRECTION: SV
OS_OVR_VA: 20/
OS_SPHERE: +1.00
OD_AXIS: 080
OD_CYLINDER: -0.25
OD_OVR_VA: 20/
OS_CYLINDER: -0.50

## 2023-08-11 ASSESSMENT — VISUAL ACUITY
OD_BCVA: 20/40-1
OS_BCVA: 20/80

## 2023-08-20 ENCOUNTER — EMERGENCY (EMERGENCY)
Facility: HOSPITAL | Age: 28
LOS: 1 days | Discharge: HOME | End: 2023-08-20
Attending: EMERGENCY MEDICINE
Payer: MEDICAID

## 2023-08-20 VITALS
RESPIRATION RATE: 20 BRPM | WEIGHT: 259.93 LBS | HEART RATE: 53 BPM | SYSTOLIC BLOOD PRESSURE: 109 MMHG | TEMPERATURE: 98 F | HEIGHT: 66 IN | DIASTOLIC BLOOD PRESSURE: 62 MMHG | OXYGEN SATURATION: 99 %

## 2023-08-20 VITALS — HEART RATE: 61 BPM

## 2023-08-20 LAB
HCG UR QL: NEGATIVE — SIGNIFICANT CHANGE UP
RAPID RVP RESULT: SIGNIFICANT CHANGE UP
SARS-COV-2 RNA SPEC QL NAA+PROBE: SIGNIFICANT CHANGE UP

## 2023-08-20 PROCEDURE — 71046 X-RAY EXAM CHEST 2 VIEWS: CPT

## 2023-08-20 PROCEDURE — 71046 X-RAY EXAM CHEST 2 VIEWS: CPT | Mod: 26

## 2023-08-20 PROCEDURE — 94640 AIRWAY INHALATION TREATMENT: CPT

## 2023-08-20 PROCEDURE — 0225U NFCT DS DNA&RNA 21 SARSCOV2: CPT

## 2023-08-20 PROCEDURE — 99284 EMERGENCY DEPT VISIT MOD MDM: CPT

## 2023-08-20 PROCEDURE — 93005 ELECTROCARDIOGRAM TRACING: CPT

## 2023-08-20 PROCEDURE — 99285 EMERGENCY DEPT VISIT HI MDM: CPT | Mod: 25

## 2023-08-20 PROCEDURE — 81025 URINE PREGNANCY TEST: CPT

## 2023-08-20 PROCEDURE — 96372 THER/PROPH/DIAG INJ SC/IM: CPT

## 2023-08-20 PROCEDURE — 93010 ELECTROCARDIOGRAM REPORT: CPT

## 2023-08-20 RX ORDER — POLYMYXIN B SULF/TRIMETHOPRIM 10000-1/ML
1 DROPS OPHTHALMIC (EYE)
Qty: 1 | Refills: 0
Start: 2023-08-20 | End: 2023-08-26

## 2023-08-20 RX ORDER — FLUTICASONE PROPIONATE 50 MCG
2 SPRAY, SUSPENSION NASAL ONCE
Refills: 0 | Status: COMPLETED | OUTPATIENT
Start: 2023-08-20 | End: 2023-08-20

## 2023-08-20 RX ORDER — KETOROLAC TROMETHAMINE 30 MG/ML
30 SYRINGE (ML) INJECTION ONCE
Refills: 0 | Status: DISCONTINUED | OUTPATIENT
Start: 2023-08-20 | End: 2023-08-20

## 2023-08-20 RX ADMIN — Medication 2 SPRAY(S): at 14:15

## 2023-08-20 RX ADMIN — Medication 30 MILLIGRAM(S): at 16:29

## 2023-08-20 NOTE — ED STATDOCS - OBJECTIVE STATEMENT
27 y/o female with PMHx of MRSA and obesity presents to ED c/o vomiting, nausea, congestion, headache, chest pain and SOB x2 days. Pt also states she has orbital cellulitis in bilateral eyes for about 3 weeks. Pt states she now has double and blurry vision. Pt also reports a fever last night that has since resolved. No daily medication use. No birth control or hormones. Pt states she is currently living and sleeping in a moldy place currently and is concerned for mold intoxication. 29 y/o female with PMHx of MRSA and obesity presents to ED c/o vomiting, nausea, congestion, headache, chest pain and SOB x2 days. Pt also states she thinks she has orbital cellulitis in bilateral eyes for about 3 weeks because her eyelids are red and itchy. No trauma. No visual changes. Pt also reports a fever last night that has since resolved. No daily medication use. No birth control or hormones. Pt states she is currently living and sleeping in a moldy place currently and is concerned for mold intoxication. No LE swelling or pain.

## 2023-08-20 NOTE — ED STATDOCS - CLINICAL SUMMARY MEDICAL DECISION MAKING FREE TEXT BOX
Pt presenting with multiple complaints, headache myalgias cough and sinus pain, no orbital cellulitis despite triage note. Normal neuro exam. Treat symptoms, RVP, medicate and reassess. Pt presenting with multiple complaints, headache myalgias cough and sinus pain, no orbital cellulitis despite triage note. Normal neuro exam. Treat symptoms, RVP, medicate and reassess.    Pts pregnancy negative, viral swab negative, CXR negative for acute pathology. Pt stable for d/c with meds. Pt presenting with multiple complaints, headache myalgias cough and sinus pain, no clinical signs of orbital cellulitis despite triage note. Normal neuro exam. Treat symptoms, RVP, medicate and reassess.    Pts pregnancy negative, viral swab negative, CXR negative for acute pathology. Pt stable for d/c with meds.

## 2023-08-20 NOTE — ED STATDOCS - PATIENT PORTAL LINK FT
You can access the FollowMyHealth Patient Portal offered by [facility] by registering at the following website: [email or web address]. By joining [employer]’s FollowMyHealth portal, you will also be able to view your health information using other applications (apps) compatible with our system.

## 2023-08-20 NOTE — ED STATDOCS - ENMT, MLM
Bilateral nasal congestion. Mouth with normal mucosa  Throat has no vesicles, no oropharyngeal exudates and uvula is midline.

## 2023-08-20 NOTE — ED ADULT TRIAGE NOTE - CHIEF COMPLAINT QUOTE
Pt c/o chest pain, SOB, nausea and vomiting, fever since last night, states she has right orbital cellulitis x 2 weeks but not been treated yet

## 2023-08-21 ENCOUNTER — APPOINTMENT (OUTPATIENT)
Dept: ORTHOPEDIC SURGERY | Facility: CLINIC | Age: 28
End: 2023-08-21

## 2023-10-18 ENCOUNTER — EMERGENCY (EMERGENCY)
Facility: HOSPITAL | Age: 28
LOS: 1 days | Discharge: HOME | End: 2023-10-18
Attending: EMERGENCY MEDICINE
Payer: MEDICAID

## 2023-10-18 VITALS
OXYGEN SATURATION: 95 % | RESPIRATION RATE: 18 BRPM | TEMPERATURE: 98 F | DIASTOLIC BLOOD PRESSURE: 96 MMHG | HEART RATE: 84 BPM | SYSTOLIC BLOOD PRESSURE: 142 MMHG

## 2023-10-18 VITALS
DIASTOLIC BLOOD PRESSURE: 66 MMHG | OXYGEN SATURATION: 96 % | RESPIRATION RATE: 18 BRPM | HEART RATE: 70 BPM | SYSTOLIC BLOOD PRESSURE: 142 MMHG | HEIGHT: 66 IN | TEMPERATURE: 98 F | WEIGHT: 293 LBS

## 2023-10-18 LAB
RAPID RVP RESULT: DETECTED
RAPID RVP RESULT: DETECTED
RV+EV RNA SPEC QL NAA+PROBE: DETECTED
RV+EV RNA SPEC QL NAA+PROBE: DETECTED
SARS-COV-2 RNA SPEC QL NAA+PROBE: SIGNIFICANT CHANGE UP
SARS-COV-2 RNA SPEC QL NAA+PROBE: SIGNIFICANT CHANGE UP

## 2023-10-18 PROCEDURE — 96374 THER/PROPH/DIAG INJ IV PUSH: CPT

## 2023-10-18 PROCEDURE — 93005 ELECTROCARDIOGRAM TRACING: CPT

## 2023-10-18 PROCEDURE — 96375 TX/PRO/DX INJ NEW DRUG ADDON: CPT

## 2023-10-18 PROCEDURE — 99284 EMERGENCY DEPT VISIT MOD MDM: CPT

## 2023-10-18 PROCEDURE — 93010 ELECTROCARDIOGRAM REPORT: CPT

## 2023-10-18 PROCEDURE — 99285 EMERGENCY DEPT VISIT HI MDM: CPT | Mod: 25

## 2023-10-18 PROCEDURE — 71046 X-RAY EXAM CHEST 2 VIEWS: CPT

## 2023-10-18 PROCEDURE — 71046 X-RAY EXAM CHEST 2 VIEWS: CPT | Mod: 26

## 2023-10-18 PROCEDURE — 0225U NFCT DS DNA&RNA 21 SARSCOV2: CPT

## 2023-10-18 PROCEDURE — 94640 AIRWAY INHALATION TREATMENT: CPT

## 2023-10-18 RX ORDER — IPRATROPIUM/ALBUTEROL SULFATE 18-103MCG
3 AEROSOL WITH ADAPTER (GRAM) INHALATION ONCE
Refills: 0 | Status: COMPLETED | OUTPATIENT
Start: 2023-10-18 | End: 2023-10-18

## 2023-10-18 RX ORDER — ALBUTEROL 90 UG/1
2 AEROSOL, METERED ORAL
Qty: 1 | Refills: 0
Start: 2023-10-18 | End: 2023-11-16

## 2023-10-18 RX ORDER — KETOROLAC TROMETHAMINE 30 MG/ML
30 SYRINGE (ML) INJECTION ONCE
Refills: 0 | Status: DISCONTINUED | OUTPATIENT
Start: 2023-10-18 | End: 2023-10-18

## 2023-10-18 RX ORDER — MAGNESIUM SULFATE 500 MG/ML
2 VIAL (ML) INJECTION ONCE
Refills: 0 | Status: COMPLETED | OUTPATIENT
Start: 2023-10-18 | End: 2023-10-18

## 2023-10-18 RX ADMIN — Medication 60 MILLIGRAM(S): at 12:50

## 2023-10-18 RX ADMIN — Medication 3 MILLILITER(S): at 12:50

## 2023-10-18 RX ADMIN — Medication 125 MILLIGRAM(S): at 15:32

## 2023-10-18 RX ADMIN — Medication 3 MILLILITER(S): at 14:06

## 2023-10-18 RX ADMIN — Medication 30 MILLIGRAM(S): at 16:27

## 2023-10-18 RX ADMIN — Medication 150 GRAM(S): at 15:32

## 2023-10-18 NOTE — ED PROVIDER NOTE - MUSCULOSKELETAL, MLM
As we discussed, your Creatinine is better on you labs today and appear stable when compared to previous labs. I do not feel it would be beneficial for you to be admitted given the improvement of your labs. Discuss your diuretic (bumex) with your primary care doctor and nephrologist. Follow-up closely with nephrology. They need to recheck your labs next week. Return to the ER for any new or concerning symptoms. Spine appears normal, range of motion is not limited, no muscle or joint tenderness

## 2023-10-18 NOTE — ED ADULT TRIAGE NOTE - CHIEF COMPLAINT QUOTE
Pt endorses nasal congestion x 1 week, associated with intermittent fevers and difficulty taking a deep breath. RR even and unlabored on traige.

## 2023-10-18 NOTE — ED PROVIDER NOTE - NSFOLLOWUPINSTRUCTIONS_ED_ALL_ED_FT
medrol dosepack day 1: 6 pills, day 2: 5 pills, day 3: 4 pills, Day 4; 3 pills, day 5: 2 pills, day 6: 1 pill  inhaler: 2 puffs every 6 hours

## 2023-10-18 NOTE — ED PROVIDER NOTE - PATIENT PORTAL LINK FT
You can access the FollowMyHealth Patient Portal offered by [facility] by registering at the following website: [email or web address]. By joining [employer]’s FollowMyHealth portal, you will also be able to view your health information using other applications (apps) compatible with our system.

## 2023-10-18 NOTE — ED PROVIDER NOTE - OBJECTIVE STATEMENT
29 y/o F with fever, cough, sore throat, body aches x 1 week.  pmh of asthma - patient used aunt's inhaler at home.

## 2023-11-04 ENCOUNTER — EMERGENCY (EMERGENCY)
Facility: HOSPITAL | Age: 28
LOS: 1 days | Discharge: HOME | End: 2023-11-04
Attending: EMERGENCY MEDICINE
Payer: MEDICAID

## 2023-11-04 VITALS
RESPIRATION RATE: 16 BRPM | OXYGEN SATURATION: 100 % | SYSTOLIC BLOOD PRESSURE: 101 MMHG | TEMPERATURE: 97 F | DIASTOLIC BLOOD PRESSURE: 75 MMHG | HEIGHT: 66 IN | HEART RATE: 57 BPM | WEIGHT: 259.93 LBS

## 2023-11-04 LAB
ALBUMIN SERPL ELPH-MCNC: 3.9 G/DL — SIGNIFICANT CHANGE UP (ref 3.3–5.2)
ALBUMIN SERPL ELPH-MCNC: 3.9 G/DL — SIGNIFICANT CHANGE UP (ref 3.3–5.2)
ALP SERPL-CCNC: 57 U/L — SIGNIFICANT CHANGE UP (ref 40–120)
ALP SERPL-CCNC: 57 U/L — SIGNIFICANT CHANGE UP (ref 40–120)
ALT FLD-CCNC: 35 U/L — HIGH
ALT FLD-CCNC: 35 U/L — HIGH
ANION GAP SERPL CALC-SCNC: 8 MMOL/L — SIGNIFICANT CHANGE UP (ref 5–17)
ANION GAP SERPL CALC-SCNC: 8 MMOL/L — SIGNIFICANT CHANGE UP (ref 5–17)
AST SERPL-CCNC: 25 U/L — SIGNIFICANT CHANGE UP
AST SERPL-CCNC: 25 U/L — SIGNIFICANT CHANGE UP
BASOPHILS # BLD AUTO: 0.03 K/UL — SIGNIFICANT CHANGE UP (ref 0–0.2)
BASOPHILS # BLD AUTO: 0.03 K/UL — SIGNIFICANT CHANGE UP (ref 0–0.2)
BASOPHILS NFR BLD AUTO: 0.4 % — SIGNIFICANT CHANGE UP (ref 0–2)
BASOPHILS NFR BLD AUTO: 0.4 % — SIGNIFICANT CHANGE UP (ref 0–2)
BILIRUB SERPL-MCNC: 0.3 MG/DL — LOW (ref 0.4–2)
BILIRUB SERPL-MCNC: 0.3 MG/DL — LOW (ref 0.4–2)
BUN SERPL-MCNC: 10.9 MG/DL — SIGNIFICANT CHANGE UP (ref 8–20)
BUN SERPL-MCNC: 10.9 MG/DL — SIGNIFICANT CHANGE UP (ref 8–20)
CALCIUM SERPL-MCNC: 8.9 MG/DL — SIGNIFICANT CHANGE UP (ref 8.4–10.5)
CALCIUM SERPL-MCNC: 8.9 MG/DL — SIGNIFICANT CHANGE UP (ref 8.4–10.5)
CHLORIDE SERPL-SCNC: 105 MMOL/L — SIGNIFICANT CHANGE UP (ref 96–108)
CHLORIDE SERPL-SCNC: 105 MMOL/L — SIGNIFICANT CHANGE UP (ref 96–108)
CO2 SERPL-SCNC: 24 MMOL/L — SIGNIFICANT CHANGE UP (ref 22–29)
CO2 SERPL-SCNC: 24 MMOL/L — SIGNIFICANT CHANGE UP (ref 22–29)
CREAT SERPL-MCNC: 0.48 MG/DL — LOW (ref 0.5–1.3)
CREAT SERPL-MCNC: 0.48 MG/DL — LOW (ref 0.5–1.3)
EGFR: 134 ML/MIN/1.73M2 — SIGNIFICANT CHANGE UP
EGFR: 134 ML/MIN/1.73M2 — SIGNIFICANT CHANGE UP
EOSINOPHIL # BLD AUTO: 0.48 K/UL — SIGNIFICANT CHANGE UP (ref 0–0.5)
EOSINOPHIL # BLD AUTO: 0.48 K/UL — SIGNIFICANT CHANGE UP (ref 0–0.5)
EOSINOPHIL NFR BLD AUTO: 6.2 % — HIGH (ref 0–6)
EOSINOPHIL NFR BLD AUTO: 6.2 % — HIGH (ref 0–6)
GLUCOSE SERPL-MCNC: 107 MG/DL — HIGH (ref 70–99)
GLUCOSE SERPL-MCNC: 107 MG/DL — HIGH (ref 70–99)
HCG SERPL-ACNC: <4 MIU/ML — SIGNIFICANT CHANGE UP
HCG SERPL-ACNC: <4 MIU/ML — SIGNIFICANT CHANGE UP
HCT VFR BLD CALC: 43.3 % — SIGNIFICANT CHANGE UP (ref 34.5–45)
HCT VFR BLD CALC: 43.3 % — SIGNIFICANT CHANGE UP (ref 34.5–45)
HGB BLD-MCNC: 14.6 G/DL — SIGNIFICANT CHANGE UP (ref 11.5–15.5)
HGB BLD-MCNC: 14.6 G/DL — SIGNIFICANT CHANGE UP (ref 11.5–15.5)
IMM GRANULOCYTES NFR BLD AUTO: 0.4 % — SIGNIFICANT CHANGE UP (ref 0–0.9)
IMM GRANULOCYTES NFR BLD AUTO: 0.4 % — SIGNIFICANT CHANGE UP (ref 0–0.9)
LYMPHOCYTES # BLD AUTO: 1.91 K/UL — SIGNIFICANT CHANGE UP (ref 1–3.3)
LYMPHOCYTES # BLD AUTO: 1.91 K/UL — SIGNIFICANT CHANGE UP (ref 1–3.3)
LYMPHOCYTES # BLD AUTO: 24.7 % — SIGNIFICANT CHANGE UP (ref 13–44)
LYMPHOCYTES # BLD AUTO: 24.7 % — SIGNIFICANT CHANGE UP (ref 13–44)
MCHC RBC-ENTMCNC: 28 PG — SIGNIFICANT CHANGE UP (ref 27–34)
MCHC RBC-ENTMCNC: 28 PG — SIGNIFICANT CHANGE UP (ref 27–34)
MCHC RBC-ENTMCNC: 33.7 GM/DL — SIGNIFICANT CHANGE UP (ref 32–36)
MCHC RBC-ENTMCNC: 33.7 GM/DL — SIGNIFICANT CHANGE UP (ref 32–36)
MCV RBC AUTO: 83.1 FL — SIGNIFICANT CHANGE UP (ref 80–100)
MCV RBC AUTO: 83.1 FL — SIGNIFICANT CHANGE UP (ref 80–100)
MONOCYTES # BLD AUTO: 0.52 K/UL — SIGNIFICANT CHANGE UP (ref 0–0.9)
MONOCYTES # BLD AUTO: 0.52 K/UL — SIGNIFICANT CHANGE UP (ref 0–0.9)
MONOCYTES NFR BLD AUTO: 6.7 % — SIGNIFICANT CHANGE UP (ref 2–14)
MONOCYTES NFR BLD AUTO: 6.7 % — SIGNIFICANT CHANGE UP (ref 2–14)
NEUTROPHILS # BLD AUTO: 4.76 K/UL — SIGNIFICANT CHANGE UP (ref 1.8–7.4)
NEUTROPHILS # BLD AUTO: 4.76 K/UL — SIGNIFICANT CHANGE UP (ref 1.8–7.4)
NEUTROPHILS NFR BLD AUTO: 61.6 % — SIGNIFICANT CHANGE UP (ref 43–77)
NEUTROPHILS NFR BLD AUTO: 61.6 % — SIGNIFICANT CHANGE UP (ref 43–77)
PLATELET # BLD AUTO: 251 K/UL — SIGNIFICANT CHANGE UP (ref 150–400)
PLATELET # BLD AUTO: 251 K/UL — SIGNIFICANT CHANGE UP (ref 150–400)
POTASSIUM SERPL-MCNC: 4.3 MMOL/L — SIGNIFICANT CHANGE UP (ref 3.5–5.3)
POTASSIUM SERPL-MCNC: 4.3 MMOL/L — SIGNIFICANT CHANGE UP (ref 3.5–5.3)
POTASSIUM SERPL-SCNC: 4.3 MMOL/L — SIGNIFICANT CHANGE UP (ref 3.5–5.3)
POTASSIUM SERPL-SCNC: 4.3 MMOL/L — SIGNIFICANT CHANGE UP (ref 3.5–5.3)
PROT SERPL-MCNC: 6.9 G/DL — SIGNIFICANT CHANGE UP (ref 6.6–8.7)
PROT SERPL-MCNC: 6.9 G/DL — SIGNIFICANT CHANGE UP (ref 6.6–8.7)
RBC # BLD: 5.21 M/UL — HIGH (ref 3.8–5.2)
RBC # BLD: 5.21 M/UL — HIGH (ref 3.8–5.2)
RBC # FLD: 13.3 % — SIGNIFICANT CHANGE UP (ref 10.3–14.5)
RBC # FLD: 13.3 % — SIGNIFICANT CHANGE UP (ref 10.3–14.5)
SODIUM SERPL-SCNC: 137 MMOL/L — SIGNIFICANT CHANGE UP (ref 135–145)
SODIUM SERPL-SCNC: 137 MMOL/L — SIGNIFICANT CHANGE UP (ref 135–145)
WBC # BLD: 7.73 K/UL — SIGNIFICANT CHANGE UP (ref 3.8–10.5)
WBC # BLD: 7.73 K/UL — SIGNIFICANT CHANGE UP (ref 3.8–10.5)
WBC # FLD AUTO: 7.73 K/UL — SIGNIFICANT CHANGE UP (ref 3.8–10.5)
WBC # FLD AUTO: 7.73 K/UL — SIGNIFICANT CHANGE UP (ref 3.8–10.5)

## 2023-11-04 PROCEDURE — 96374 THER/PROPH/DIAG INJ IV PUSH: CPT

## 2023-11-04 PROCEDURE — 99284 EMERGENCY DEPT VISIT MOD MDM: CPT

## 2023-11-04 PROCEDURE — 84702 CHORIONIC GONADOTROPIN TEST: CPT

## 2023-11-04 PROCEDURE — 80053 COMPREHEN METABOLIC PANEL: CPT

## 2023-11-04 PROCEDURE — 85025 COMPLETE CBC W/AUTO DIFF WBC: CPT

## 2023-11-04 PROCEDURE — 36415 COLL VENOUS BLD VENIPUNCTURE: CPT

## 2023-11-04 PROCEDURE — 96375 TX/PRO/DX INJ NEW DRUG ADDON: CPT

## 2023-11-04 PROCEDURE — 99284 EMERGENCY DEPT VISIT MOD MDM: CPT | Mod: 25

## 2023-11-04 RX ORDER — METOCLOPRAMIDE HCL 10 MG
10 TABLET ORAL ONCE
Refills: 0 | Status: COMPLETED | OUTPATIENT
Start: 2023-11-04 | End: 2023-11-04

## 2023-11-04 RX ORDER — SODIUM CHLORIDE 9 MG/ML
1000 INJECTION INTRAMUSCULAR; INTRAVENOUS; SUBCUTANEOUS ONCE
Refills: 0 | Status: COMPLETED | OUTPATIENT
Start: 2023-11-04 | End: 2023-11-04

## 2023-11-04 RX ORDER — ACETAMINOPHEN 500 MG
1000 TABLET ORAL ONCE
Refills: 0 | Status: COMPLETED | OUTPATIENT
Start: 2023-11-04 | End: 2023-11-04

## 2023-11-04 RX ADMIN — SODIUM CHLORIDE 1000 MILLILITER(S): 9 INJECTION INTRAMUSCULAR; INTRAVENOUS; SUBCUTANEOUS at 16:19

## 2023-11-04 RX ADMIN — Medication 10 MILLIGRAM(S): at 16:21

## 2023-11-04 RX ADMIN — Medication 400 MILLIGRAM(S): at 16:20

## 2023-11-04 NOTE — ED PROVIDER NOTE - CLINICAL SUMMARY MEDICAL DECISION MAKING FREE TEXT BOX
Nutrition Care Plan    Nutrition Diagnosis:   Predicted suboptimal energy intake related to generalized weakness with progressive supranuclear palsy and parkinsonism as evidenced by mild hypokalemia on admission and weight loss 19#/19% from weight taken 3/17/21.    Intervention:  General/healthful diet:   Continue Regular diet as ordered.   Patient currently with good appetite, consider adding oral nutrition supplements if po intake is sub optimal at meals.     Monitoring and Evaluation:  Amount of food:   Consume 75% or greater at meal selections daily.      29 y/o female with PMHx of asthma presents to the ED c/o one month of right sided headache that started one month ago, has been constant over the past week, worse over the past 48 hours, associated with throat pain, neck pain, ear pain, photophobia, phonophobia. Pt with likely sinusitis given recent infection last month, will check labs due to excessive ibuprofen use, if labs negative start Augmentin, PMD f/u 27 y/o female with PMHx of asthma presents to the ED c/o one month of right sided headache that started one month ago, has been constant over the past week, worse over the past 48 hours, associated with throat pain, neck pain, ear pain, photophobia, phonophobia. Pt with likely sinusitis given recent infection last month, will check labs due to excessive ibuprofen use, if labs negative start Augmentin, CT in april 2023 wnl PMD f/u

## 2023-11-04 NOTE — ED PROVIDER NOTE - PROVIDER TOKENS
PROVIDER:[TOKEN:[[id number]],FOLLOWUP:[7-10 Days]],PROVIDER:[TOKEN:[[id number]],FOLLOWUP:[7-10 Days]]

## 2023-11-04 NOTE — ED PROVIDER NOTE - CARE PROVIDER_API CALL
Florencio Cherry  Otolaryngology  [address]  [place] [address]  Phone: [phone]  Fax: [phone]  Follow Up Time: 7-10 Days    Rosalina Cee  Dermatology  [address], [address]  [place] [address]  Phone: [phone]  Fax: [phone]  Follow Up Time: 7-10 Days

## 2023-11-04 NOTE — ED PROVIDER NOTE - PROGRESS NOTE DETAILS
Pt reports improvement of symptoms in the ED.  Lab reviewed and grossly unremarkable. Mildly elevated ALT 35  Rx augmentin and instructed to f/u with ENT clinic in 1 week and PCP  Patient also requesting referral for dermatology.   Strict ED return precautions given if any new or worsening symptoms

## 2023-11-04 NOTE — ED PROVIDER NOTE - ATTENDING APP SHARED VISIT CONTRIBUTION OF CARE
I, Karlee Vásquez, performed the initial face to face bedside interview with this patient regarding history of present illness, review of symptoms and relevant past medical, social and family history.  I completed an independent physical examination.  I was the initial provider who evaluated this patient. I have signed out the follow up of any pending tests (i.e. labs, radiological studies) to the ACP.  I have communicated the patient’s plan of care and disposition with the ACP.

## 2023-11-04 NOTE — ED ADULT NURSE NOTE - NSFALLRISKASMTTYPE_ED_ALL_ED
Please review. Protocol failed / Has no protocol.     Future Appointments   Date Time Provider Nelson Pollock   11/2/2023  9:00 AM MD GIGI VargasO   11/24/2023  2:00 PM MD GIGI Vargas EC ADO   No Active/ Future labs pended yet    Requested Prescriptions   Pending Prescriptions Disp Refills    METFORMIN HCL 1000 MG Oral Tab [Pharmacy Med Name: METFORMIN 1000MG TABLETS] 90 tablet 0     Sig: TAKE 1 TABLET(1000 MG) BY MOUTH EVERY NIGHT       Diabetes Medication Protocol Failed - 10/18/2023  6:13 AM        Failed - Last A1C < 7.5 and within past 6 months     Lab Results   Component Value Date    A1C 5.9 (A) 09/20/2022             Passed - In person appointment or virtual visit in the past 6 mos or appointment in next 3 mos     Recent Outpatient Visits              11 months ago Medicare annual wellness visit, subsequent    [address], [place], Rolando Sierra MD    Office Visit    1 year ago Controlled type 2 diabetes mellitus without complication, without long-term current use of insulin ([facility] [address]    [address], [place], Rolando Sierra MD    Office Visit    1 year ago Medicare annual wellness visit, subsequent    [address], [place], Rolando Sierra MD    Office Visit    2 years ago Positive FIT (fecal immunochemical test)    [address], [address] [facility]    Office Visit    2 years ago Uncontrolled type 2 diabetes mellitus with hyperglycemia [facility])    [address], [place], Rolando Sierra MD    Office Visit          Future Appointments         Provider Department Appt Notes    In 2 weeks MD Tanja Vargas [place] MAPX    In 1 month Lamar Coates MD [address], [place], Alex[place] Feel like have to urinate more frequently                      Passed - EGFRCR or GFRNAA > 50     GFR Evaluation  EGFRCR: 75 , resulted on 10/27/2022          Passed - GFR in the past 12 months           Future Appointments         Provider Department Appt Notes    In 2 weeks Lamar Coates MD [facility] [place]    In 1 month Lamar Coates MD [facility], [place] Feel like have to urinate more frequently           Recent Outpatient Visits              11 months ago Medicare annual wellness visit, subsequent    [place] Wenceslao See, MD    Office Visit    1 year ago Controlled type 2 diabetes mellitus without complication, without long-term current use of insulin ([address]    [place] W[place]la[place] See, MD    Office Visit    1 year ago Medicare annual wellness visit, subsequent    [facility] [place]    Office Visit    2 years ago Positive FIT (fecal immunochemical test)    Feli Schultz, [address] [facility], [place]    Office Visit    2 years ago Uncontrolled type 2 diabetes mellitus with hyperglycemia [facility])    [facility] [place]    Office Visit Initial (On Arrival)

## 2023-11-04 NOTE — ED PROVIDER NOTE - CARE PROVIDERS DIRECT ADDRESSES
[email or web address]

## 2023-11-04 NOTE — ED PROVIDER NOTE - OBJECTIVE STATEMENT
29 y/o female with PMHx of asthma presents to the ED c/o one month of right sided headache that started one month ago, has been constant over the past week, worse over the past 48 hours, associated with throat pain, neck pain, ear pain, photophobia, phonophobia. Pt reports had URI about 1 month ago with sinus congestion. Denies f/c/n/v/cp/sob/palpitations/cough/rash/dizziness/abd.pain/d/c/dysuria/hematuria. Pt has been taking Motrin for pain, reports that she has been taking 1200mg at a time once a day as this was only thing that helps. LMP 2 months ago, hx of irregular periods. 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 Pt reports had URI about 1 month ago with sinus congestion. Denies f/c/n/v/cp/sob/palpitations/cough/rash/dizziness/abd.pain/d/c/dysuria/hematuria. Pt has been taking Motrin for pain, reports that she has been taking 1200mg at a time once a day as this was only thing that helps. LMP 2 months ago, hx of irregular periods. 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

## 2023-11-04 NOTE — ED ADULT NURSE NOTE - OBJECTIVE STATEMENT
Pt presents A&O x4 c/o right sided headache for weeks worsening the last two days. Pt says the pain radiates down right side to neck. Pt reports minimal dizziness when standing. Denies visual changes. Has been taking two 600 mg ibuprofen to help but it has started impeding on her daily life. RR even and unlabored. Able to make needs known.

## 2023-11-04 NOTE — ED PROVIDER NOTE - PHYSICAL EXAMINATION
Head: atraumatic, normocephalic  Face: atraumatic, no crepitus no orbital/maxillary/mandibular ttp  ears; TM WNL bilaterally   throat: uvula midline no exudates  eyes: perrla eomi  heart: rrr s1s2  lungs: ctab  abd: soft, nt nd +bs no rebound/guarding no cva ttp  skin: warm  LE: no swelling, no calf ttp  back: no midline cervical/thoracic/lumbar ttp  neuro: CN III-XII intact, 5/5 strength bilateral upper and lower extremities, normal finger to nose, no facial droop, neck supple no meningismus

## 2023-11-04 NOTE — ED PROVIDER NOTE - PATIENT PORTAL LINK FT
You can access the FollowMyHealth Patient Portal offered by [facility] by registering at the following website: [email or web address]. By joining [employer]’s FollowMyHealth portal, you will also be able to view your health information using other applications (apps) compatible with our system.

## 2023-11-04 NOTE — ED PROVIDER NOTE - NSFOLLOWUPINSTRUCTIONS_ED_ALL_ED_FT
Please take all medications as prescribed with food  1)Follow up with your PCP in 1 week  2)Follow up with ENT clinic in 1 week  Return to the emergency room if you are experiencing any new or worsening symptoms    Headache    A headache is pain or discomfort felt around the head or neck area. The specific cause of a headache may not be found as there are many types including tension headaches, migraine headaches, and cluster headaches. Watch your condition for any changes. Things you can do to manage your pain include taking over the counter and prescription medications as instructed by your health care provider, lying down in a dark quiet room, limiting stress, getting regular sleep, and refraining from alcohol and tobacco products.    SEEK IMMEDIATE MEDICAL CARE IF YOU HAVE ANY OF THE FOLLOWING SYMPTOMS: fever, vomiting, stiff neck, loss of vision, problems with speech, muscle weakness, loss of balance, trouble walking, passing out, or confusion.

## 2023-11-12 ENCOUNTER — INPATIENT (INPATIENT)
Facility: HOSPITAL | Age: 28
LOS: 3 days | Discharge: HOME | DRG: 92 | End: 2023-11-16
Attending: INTERNAL MEDICINE | Admitting: STUDENT IN AN ORGANIZED HEALTH CARE EDUCATION/TRAINING PROGRAM
Payer: MEDICAID

## 2023-11-12 VITALS
RESPIRATION RATE: 18 BRPM | TEMPERATURE: 98 F | HEIGHT: 66 IN | HEART RATE: 72 BPM | WEIGHT: 293 LBS | OXYGEN SATURATION: 100 % | SYSTOLIC BLOOD PRESSURE: 142 MMHG | DIASTOLIC BLOOD PRESSURE: 95 MMHG

## 2023-11-12 LAB
ALBUMIN SERPL ELPH-MCNC: 4 G/DL — SIGNIFICANT CHANGE UP (ref 3.3–5.2)
ALBUMIN SERPL ELPH-MCNC: 4 G/DL — SIGNIFICANT CHANGE UP (ref 3.3–5.2)
ALP SERPL-CCNC: 61 U/L — SIGNIFICANT CHANGE UP (ref 40–120)
ALP SERPL-CCNC: 61 U/L — SIGNIFICANT CHANGE UP (ref 40–120)
ALT FLD-CCNC: 35 U/L — HIGH
ALT FLD-CCNC: 35 U/L — HIGH
ANION GAP SERPL CALC-SCNC: 10 MMOL/L — SIGNIFICANT CHANGE UP (ref 5–17)
ANION GAP SERPL CALC-SCNC: 10 MMOL/L — SIGNIFICANT CHANGE UP (ref 5–17)
AST SERPL-CCNC: 17 U/L — SIGNIFICANT CHANGE UP
AST SERPL-CCNC: 17 U/L — SIGNIFICANT CHANGE UP
BASOPHILS # BLD AUTO: 0.04 K/UL — SIGNIFICANT CHANGE UP (ref 0–0.2)
BASOPHILS # BLD AUTO: 0.04 K/UL — SIGNIFICANT CHANGE UP (ref 0–0.2)
BASOPHILS NFR BLD AUTO: 0.4 % — SIGNIFICANT CHANGE UP (ref 0–2)
BASOPHILS NFR BLD AUTO: 0.4 % — SIGNIFICANT CHANGE UP (ref 0–2)
BILIRUB SERPL-MCNC: 0.4 MG/DL — SIGNIFICANT CHANGE UP (ref 0.4–2)
BILIRUB SERPL-MCNC: 0.4 MG/DL — SIGNIFICANT CHANGE UP (ref 0.4–2)
BUN SERPL-MCNC: 11.6 MG/DL — SIGNIFICANT CHANGE UP (ref 8–20)
BUN SERPL-MCNC: 11.6 MG/DL — SIGNIFICANT CHANGE UP (ref 8–20)
CALCIUM SERPL-MCNC: 9.1 MG/DL — SIGNIFICANT CHANGE UP (ref 8.4–10.5)
CALCIUM SERPL-MCNC: 9.1 MG/DL — SIGNIFICANT CHANGE UP (ref 8.4–10.5)
CHLORIDE SERPL-SCNC: 102 MMOL/L — SIGNIFICANT CHANGE UP (ref 96–108)
CHLORIDE SERPL-SCNC: 102 MMOL/L — SIGNIFICANT CHANGE UP (ref 96–108)
CO2 SERPL-SCNC: 27 MMOL/L — SIGNIFICANT CHANGE UP (ref 22–29)
CO2 SERPL-SCNC: 27 MMOL/L — SIGNIFICANT CHANGE UP (ref 22–29)
CREAT SERPL-MCNC: 0.63 MG/DL — SIGNIFICANT CHANGE UP (ref 0.5–1.3)
CREAT SERPL-MCNC: 0.63 MG/DL — SIGNIFICANT CHANGE UP (ref 0.5–1.3)
EGFR: 125 ML/MIN/1.73M2 — SIGNIFICANT CHANGE UP
EGFR: 125 ML/MIN/1.73M2 — SIGNIFICANT CHANGE UP
EOSINOPHIL # BLD AUTO: 0.44 K/UL — SIGNIFICANT CHANGE UP (ref 0–0.5)
EOSINOPHIL # BLD AUTO: 0.44 K/UL — SIGNIFICANT CHANGE UP (ref 0–0.5)
EOSINOPHIL NFR BLD AUTO: 4.2 % — SIGNIFICANT CHANGE UP (ref 0–6)
EOSINOPHIL NFR BLD AUTO: 4.2 % — SIGNIFICANT CHANGE UP (ref 0–6)
GLUCOSE SERPL-MCNC: 81 MG/DL — SIGNIFICANT CHANGE UP (ref 70–99)
GLUCOSE SERPL-MCNC: 81 MG/DL — SIGNIFICANT CHANGE UP (ref 70–99)
HCG SERPL-ACNC: <4 MIU/ML — SIGNIFICANT CHANGE UP
HCG SERPL-ACNC: <4 MIU/ML — SIGNIFICANT CHANGE UP
HCT VFR BLD CALC: 43.5 % — SIGNIFICANT CHANGE UP (ref 34.5–45)
HCT VFR BLD CALC: 43.5 % — SIGNIFICANT CHANGE UP (ref 34.5–45)
HGB BLD-MCNC: 14.4 G/DL — SIGNIFICANT CHANGE UP (ref 11.5–15.5)
HGB BLD-MCNC: 14.4 G/DL — SIGNIFICANT CHANGE UP (ref 11.5–15.5)
IMM GRANULOCYTES NFR BLD AUTO: 0.2 % — SIGNIFICANT CHANGE UP (ref 0–0.9)
IMM GRANULOCYTES NFR BLD AUTO: 0.2 % — SIGNIFICANT CHANGE UP (ref 0–0.9)
LYMPHOCYTES # BLD AUTO: 2.22 K/UL — SIGNIFICANT CHANGE UP (ref 1–3.3)
LYMPHOCYTES # BLD AUTO: 2.22 K/UL — SIGNIFICANT CHANGE UP (ref 1–3.3)
LYMPHOCYTES # BLD AUTO: 21.3 % — SIGNIFICANT CHANGE UP (ref 13–44)
LYMPHOCYTES # BLD AUTO: 21.3 % — SIGNIFICANT CHANGE UP (ref 13–44)
MCHC RBC-ENTMCNC: 27.8 PG — SIGNIFICANT CHANGE UP (ref 27–34)
MCHC RBC-ENTMCNC: 27.8 PG — SIGNIFICANT CHANGE UP (ref 27–34)
MCHC RBC-ENTMCNC: 33.1 GM/DL — SIGNIFICANT CHANGE UP (ref 32–36)
MCHC RBC-ENTMCNC: 33.1 GM/DL — SIGNIFICANT CHANGE UP (ref 32–36)
MCV RBC AUTO: 84 FL — SIGNIFICANT CHANGE UP (ref 80–100)
MCV RBC AUTO: 84 FL — SIGNIFICANT CHANGE UP (ref 80–100)
MONOCYTES # BLD AUTO: 0.73 K/UL — SIGNIFICANT CHANGE UP (ref 0–0.9)
MONOCYTES # BLD AUTO: 0.73 K/UL — SIGNIFICANT CHANGE UP (ref 0–0.9)
MONOCYTES NFR BLD AUTO: 7 % — SIGNIFICANT CHANGE UP (ref 2–14)
MONOCYTES NFR BLD AUTO: 7 % — SIGNIFICANT CHANGE UP (ref 2–14)
NEUTROPHILS # BLD AUTO: 6.95 K/UL — SIGNIFICANT CHANGE UP (ref 1.8–7.4)
NEUTROPHILS # BLD AUTO: 6.95 K/UL — SIGNIFICANT CHANGE UP (ref 1.8–7.4)
NEUTROPHILS NFR BLD AUTO: 66.9 % — SIGNIFICANT CHANGE UP (ref 43–77)
NEUTROPHILS NFR BLD AUTO: 66.9 % — SIGNIFICANT CHANGE UP (ref 43–77)
PLATELET # BLD AUTO: 278 K/UL — SIGNIFICANT CHANGE UP (ref 150–400)
PLATELET # BLD AUTO: 278 K/UL — SIGNIFICANT CHANGE UP (ref 150–400)
POTASSIUM SERPL-MCNC: 4.1 MMOL/L — SIGNIFICANT CHANGE UP (ref 3.5–5.3)
POTASSIUM SERPL-MCNC: 4.1 MMOL/L — SIGNIFICANT CHANGE UP (ref 3.5–5.3)
POTASSIUM SERPL-SCNC: 4.1 MMOL/L — SIGNIFICANT CHANGE UP (ref 3.5–5.3)
POTASSIUM SERPL-SCNC: 4.1 MMOL/L — SIGNIFICANT CHANGE UP (ref 3.5–5.3)
PROT SERPL-MCNC: 7.1 G/DL — SIGNIFICANT CHANGE UP (ref 6.6–8.7)
PROT SERPL-MCNC: 7.1 G/DL — SIGNIFICANT CHANGE UP (ref 6.6–8.7)
RBC # BLD: 5.18 M/UL — SIGNIFICANT CHANGE UP (ref 3.8–5.2)
RBC # BLD: 5.18 M/UL — SIGNIFICANT CHANGE UP (ref 3.8–5.2)
RBC # FLD: 13.2 % — SIGNIFICANT CHANGE UP (ref 10.3–14.5)
RBC # FLD: 13.2 % — SIGNIFICANT CHANGE UP (ref 10.3–14.5)
SODIUM SERPL-SCNC: 139 MMOL/L — SIGNIFICANT CHANGE UP (ref 135–145)
SODIUM SERPL-SCNC: 139 MMOL/L — SIGNIFICANT CHANGE UP (ref 135–145)
WBC # BLD: 10.4 K/UL — SIGNIFICANT CHANGE UP (ref 3.8–10.5)
WBC # BLD: 10.4 K/UL — SIGNIFICANT CHANGE UP (ref 3.8–10.5)
WBC # FLD AUTO: 10.4 K/UL — SIGNIFICANT CHANGE UP (ref 3.8–10.5)
WBC # FLD AUTO: 10.4 K/UL — SIGNIFICANT CHANGE UP (ref 3.8–10.5)

## 2023-11-12 PROCEDURE — 70450 CT HEAD/BRAIN W/O DYE: CPT | Mod: 26,MA

## 2023-11-12 PROCEDURE — 99285 EMERGENCY DEPT VISIT HI MDM: CPT

## 2023-11-12 PROCEDURE — 70491 CT SOFT TISSUE NECK W/DYE: CPT | Mod: 26,MA

## 2023-11-12 RX ORDER — DEXAMETHASONE 0.5 MG/5ML
8 ELIXIR ORAL ONCE
Refills: 0 | Status: COMPLETED | OUTPATIENT
Start: 2023-11-12 | End: 2023-11-12

## 2023-11-12 RX ORDER — SODIUM CHLORIDE 9 MG/ML
1000 INJECTION INTRAMUSCULAR; INTRAVENOUS; SUBCUTANEOUS ONCE
Refills: 0 | Status: COMPLETED | OUTPATIENT
Start: 2023-11-12 | End: 2023-11-12

## 2023-11-12 RX ORDER — MORPHINE SULFATE 50 MG/1
4 CAPSULE, EXTENDED RELEASE ORAL ONCE
Refills: 0 | Status: DISCONTINUED | OUTPATIENT
Start: 2023-11-12 | End: 2023-11-12

## 2023-11-12 RX ORDER — DIAZEPAM 5 MG
5 TABLET ORAL ONCE
Refills: 0 | Status: DISCONTINUED | OUTPATIENT
Start: 2023-11-12 | End: 2023-11-12

## 2023-11-12 RX ORDER — KETOROLAC TROMETHAMINE 30 MG/ML
30 SYRINGE (ML) INJECTION ONCE
Refills: 0 | Status: DISCONTINUED | OUTPATIENT
Start: 2023-11-12 | End: 2023-11-12

## 2023-11-12 RX ADMIN — MORPHINE SULFATE 4 MILLIGRAM(S): 50 CAPSULE, EXTENDED RELEASE ORAL at 23:56

## 2023-11-12 RX ADMIN — Medication 30 MILLIGRAM(S): at 18:51

## 2023-11-12 RX ADMIN — SODIUM CHLORIDE 2000 MILLILITER(S): 9 INJECTION INTRAMUSCULAR; INTRAVENOUS; SUBCUTANEOUS at 18:52

## 2023-11-12 RX ADMIN — Medication 5 MILLIGRAM(S): at 18:50

## 2023-11-12 RX ADMIN — Medication 101.6 MILLIGRAM(S): at 18:51

## 2023-11-12 NOTE — ED PROVIDER NOTE - PROGRESS NOTE DETAILS
EDDIE Scott: Patient evaluated by intake physician. HPI/ROS/PE as noted above. Will follow up plan per intake physician and continue to assess patient. EDDIE Scott: Received call from radiologist. Possibly sinus venous thrombosis. Neurosurgery consulted. As per NSx hold heparin until after their evaluation. EDDIE Scott: Received call from radiologist. Possibly sinus venous thrombosis. Neurosurgery consulted. As per NSx hold heparin until after their evaluation. Patient still in pain. Medication ordered. No OCP use. EDDIE Scott: Spoke with radiologist Dr. Tapia. Confirmed SVT on MRI. NSx made aware. Pending recommendations. Patient resting comfortably.

## 2023-11-12 NOTE — ED PROVIDER NOTE - OBJECTIVE STATEMENT
29 y/o female with PMHx of enterovirus in October 2023 presents to ED c/o worsening headache, right sided neck and ear pain. Pt was seen in ED 1 week ago for similar symptoms. Pt currently taking Tylenol and Motrin for the pain as she states these medications are the only ones that treat her pain. Pt reports tinnitus in her right ear and was given antibiotics to no relief. Pt reports intense pain at this point to the right side of her body and is worried. Pt denies smoking cigarettes but does vape. No Hx of allergies to medication. No medication intake today.

## 2023-11-12 NOTE — ED PROVIDER NOTE - ENMT, MLM
Airway patent, Nasal mucosa clear. Mouth with normal mucosa. Throat has no vesicles, no oropharyngeal exudates and uvula is midline. Otitis externa to right ear.

## 2023-11-12 NOTE — ED ADULT TRIAGE NOTE - CHIEF COMPLAINT QUOTE
Pt c/o right sided headache x 1 week, now c/o right side neck and ear pain, c/o trouble swallowing, taking tylenol and motrin without relief, seen in ED one week ago for similar symptoms

## 2023-11-12 NOTE — ED PROVIDER NOTE - CLINICAL SUMMARY MEDICAL DECISION MAKING FREE TEXT BOX
Obtain labs and scan neck and head for masses or infection. Give muscle relaxer Valium, antiinflammatories and IV contrast for CT. Give ear drops for ear pain. No obvious physical findings. Possible otitis media with muscle strains. Obtain labs and scan neck and head for masses or infection. Give muscle relaxer Valium, antiinflammatories and IV contrast for CT. Give ear drops for ear pain. No obvious physical findings. Possible otitis media with muscle strains.    EDDIE Scott 0312: Patient found to have SVT on MRI. Neurosurgery recommending full AC with heparin. Admit to step down for q2 neurochecks.

## 2023-11-12 NOTE — ED ADULT TRIAGE NOTE - TEMPERATURE IN FAHRENHEIT (DEGREES F)
Called and checked on patient, states she doing better she is taking miralax and its helping. Also would like a update on her aneurysm referral hasn't heard anything   97.7

## 2023-11-12 NOTE — ED ADULT NURSE NOTE - OBJECTIVE STATEMENT
Pt c/o right sided headache and was seen here for the same one week ago.  States she was given antibiotics and has been taking them but the pain is worse and she is now c/o R sided neck pain, ear pain, dizziness  and pain in her throat when she moves her neck.  Also c/o intermittent fevers.  Pt is a/ox4.  IV inserted to RAC, pt medicated as ordered.  IVF infusing without s/s redness or swelling noted. Awaiting CT scan.

## 2023-11-13 DIAGNOSIS — Z98.890 OTHER SPECIFIED POSTPROCEDURAL STATES: Chronic | ICD-10-CM

## 2023-11-13 DIAGNOSIS — G08 INTRACRANIAL AND INTRASPINAL PHLEBITIS AND THROMBOPHLEBITIS: ICD-10-CM

## 2023-11-13 LAB
APTT BLD: 130.9 SEC — CRITICAL HIGH (ref 24.5–35.6)
APTT BLD: 130.9 SEC — CRITICAL HIGH (ref 24.5–35.6)
APTT BLD: 30.9 SEC — SIGNIFICANT CHANGE UP (ref 24.5–35.6)
APTT BLD: 30.9 SEC — SIGNIFICANT CHANGE UP (ref 24.5–35.6)
APTT BLD: 76.6 SEC — HIGH (ref 24.5–35.6)
APTT BLD: 76.6 SEC — HIGH (ref 24.5–35.6)
BLD GP AB SCN SERPL QL: SIGNIFICANT CHANGE UP
BLD GP AB SCN SERPL QL: SIGNIFICANT CHANGE UP
HCT VFR BLD CALC: 40.2 % — SIGNIFICANT CHANGE UP (ref 34.5–45)
HCT VFR BLD CALC: 40.2 % — SIGNIFICANT CHANGE UP (ref 34.5–45)
HCT VFR BLD CALC: 44.8 % — SIGNIFICANT CHANGE UP (ref 34.5–45)
HCT VFR BLD CALC: 44.8 % — SIGNIFICANT CHANGE UP (ref 34.5–45)
HGB BLD-MCNC: 13.2 G/DL — SIGNIFICANT CHANGE UP (ref 11.5–15.5)
HGB BLD-MCNC: 13.2 G/DL — SIGNIFICANT CHANGE UP (ref 11.5–15.5)
HGB BLD-MCNC: 14.9 G/DL — SIGNIFICANT CHANGE UP (ref 11.5–15.5)
HGB BLD-MCNC: 14.9 G/DL — SIGNIFICANT CHANGE UP (ref 11.5–15.5)
INR BLD: 1.01 RATIO — SIGNIFICANT CHANGE UP (ref 0.85–1.18)
INR BLD: 1.01 RATIO — SIGNIFICANT CHANGE UP (ref 0.85–1.18)
MCHC RBC-ENTMCNC: 27.8 PG — SIGNIFICANT CHANGE UP (ref 27–34)
MCHC RBC-ENTMCNC: 27.8 PG — SIGNIFICANT CHANGE UP (ref 27–34)
MCHC RBC-ENTMCNC: 28.5 PG — SIGNIFICANT CHANGE UP (ref 27–34)
MCHC RBC-ENTMCNC: 28.5 PG — SIGNIFICANT CHANGE UP (ref 27–34)
MCHC RBC-ENTMCNC: 32.8 GM/DL — SIGNIFICANT CHANGE UP (ref 32–36)
MCHC RBC-ENTMCNC: 32.8 GM/DL — SIGNIFICANT CHANGE UP (ref 32–36)
MCHC RBC-ENTMCNC: 33.3 GM/DL — SIGNIFICANT CHANGE UP (ref 32–36)
MCHC RBC-ENTMCNC: 33.3 GM/DL — SIGNIFICANT CHANGE UP (ref 32–36)
MCV RBC AUTO: 84.8 FL — SIGNIFICANT CHANGE UP (ref 80–100)
MCV RBC AUTO: 84.8 FL — SIGNIFICANT CHANGE UP (ref 80–100)
MCV RBC AUTO: 85.8 FL — SIGNIFICANT CHANGE UP (ref 80–100)
MCV RBC AUTO: 85.8 FL — SIGNIFICANT CHANGE UP (ref 80–100)
PLATELET # BLD AUTO: 220 K/UL — SIGNIFICANT CHANGE UP (ref 150–400)
PLATELET # BLD AUTO: 220 K/UL — SIGNIFICANT CHANGE UP (ref 150–400)
PLATELET # BLD AUTO: 290 K/UL — SIGNIFICANT CHANGE UP (ref 150–400)
PLATELET # BLD AUTO: 290 K/UL — SIGNIFICANT CHANGE UP (ref 150–400)
PROTHROM AB SERPL-ACNC: 11.2 SEC — SIGNIFICANT CHANGE UP (ref 9.5–13)
PROTHROM AB SERPL-ACNC: 11.2 SEC — SIGNIFICANT CHANGE UP (ref 9.5–13)
RBC # BLD: 4.74 M/UL — SIGNIFICANT CHANGE UP (ref 3.8–5.2)
RBC # BLD: 4.74 M/UL — SIGNIFICANT CHANGE UP (ref 3.8–5.2)
RBC # BLD: 5.22 M/UL — HIGH (ref 3.8–5.2)
RBC # BLD: 5.22 M/UL — HIGH (ref 3.8–5.2)
RBC # FLD: 13.3 % — SIGNIFICANT CHANGE UP (ref 10.3–14.5)
RBC # FLD: 13.3 % — SIGNIFICANT CHANGE UP (ref 10.3–14.5)
RBC # FLD: 13.5 % — SIGNIFICANT CHANGE UP (ref 10.3–14.5)
RBC # FLD: 13.5 % — SIGNIFICANT CHANGE UP (ref 10.3–14.5)
WBC # BLD: 11.61 K/UL — HIGH (ref 3.8–10.5)
WBC # BLD: 11.61 K/UL — HIGH (ref 3.8–10.5)
WBC # BLD: 11.82 K/UL — HIGH (ref 3.8–10.5)
WBC # BLD: 11.82 K/UL — HIGH (ref 3.8–10.5)
WBC # FLD AUTO: 11.61 K/UL — HIGH (ref 3.8–10.5)
WBC # FLD AUTO: 11.61 K/UL — HIGH (ref 3.8–10.5)
WBC # FLD AUTO: 11.82 K/UL — HIGH (ref 3.8–10.5)
WBC # FLD AUTO: 11.82 K/UL — HIGH (ref 3.8–10.5)

## 2023-11-13 PROCEDURE — 99284 EMERGENCY DEPT VISIT MOD MDM: CPT

## 2023-11-13 PROCEDURE — 99222 1ST HOSP IP/OBS MODERATE 55: CPT

## 2023-11-13 PROCEDURE — 70553 MRI BRAIN STEM W/O & W/DYE: CPT | Mod: 26,MA

## 2023-11-13 PROCEDURE — 99254 IP/OBS CNSLTJ NEW/EST MOD 60: CPT

## 2023-11-13 PROCEDURE — 70545 MR ANGIOGRAPHY HEAD W/DYE: CPT | Mod: 26,MA,59

## 2023-11-13 RX ORDER — ACETAMINOPHEN 500 MG
650 TABLET ORAL EVERY 6 HOURS
Refills: 0 | Status: DISCONTINUED | OUTPATIENT
Start: 2023-11-13 | End: 2023-11-16

## 2023-11-13 RX ORDER — HEPARIN SODIUM 5000 [USP'U]/ML
10000 INJECTION INTRAVENOUS; SUBCUTANEOUS EVERY 6 HOURS
Refills: 0 | Status: DISCONTINUED | OUTPATIENT
Start: 2023-11-13 | End: 2023-11-15

## 2023-11-13 RX ORDER — HEPARIN SODIUM 5000 [USP'U]/ML
5000 INJECTION INTRAVENOUS; SUBCUTANEOUS ONCE
Refills: 0 | Status: DISCONTINUED | OUTPATIENT
Start: 2023-11-13 | End: 2023-11-13

## 2023-11-13 RX ORDER — HEPARIN SODIUM 5000 [USP'U]/ML
INJECTION INTRAVENOUS; SUBCUTANEOUS
Qty: 25000 | Refills: 0 | Status: DISCONTINUED | OUTPATIENT
Start: 2023-11-13 | End: 2023-11-13

## 2023-11-13 RX ORDER — HEPARIN SODIUM 5000 [USP'U]/ML
6000 INJECTION INTRAVENOUS; SUBCUTANEOUS EVERY 6 HOURS
Refills: 0 | Status: DISCONTINUED | OUTPATIENT
Start: 2023-11-13 | End: 2023-11-13

## 2023-11-13 RX ORDER — LANOLIN ALCOHOL/MO/W.PET/CERES
3 CREAM (GRAM) TOPICAL AT BEDTIME
Refills: 0 | Status: DISCONTINUED | OUTPATIENT
Start: 2023-11-13 | End: 2023-11-16

## 2023-11-13 RX ORDER — HEPARIN SODIUM 5000 [USP'U]/ML
10000 INJECTION INTRAVENOUS; SUBCUTANEOUS ONCE
Refills: 0 | Status: COMPLETED | OUTPATIENT
Start: 2023-11-13 | End: 2023-11-13

## 2023-11-13 RX ORDER — HEPARIN SODIUM 5000 [USP'U]/ML
2000 INJECTION INTRAVENOUS; SUBCUTANEOUS
Qty: 25000 | Refills: 0 | Status: DISCONTINUED | OUTPATIENT
Start: 2023-11-13 | End: 2023-11-15

## 2023-11-13 RX ORDER — INFLUENZA VIRUS VACCINE 15; 15; 15; 15 UG/.5ML; UG/.5ML; UG/.5ML; UG/.5ML
0.5 SUSPENSION INTRAMUSCULAR ONCE
Refills: 0 | Status: DISCONTINUED | OUTPATIENT
Start: 2023-11-13 | End: 2023-11-16

## 2023-11-13 RX ORDER — SODIUM CHLORIDE 9 MG/ML
3 INJECTION INTRAMUSCULAR; INTRAVENOUS; SUBCUTANEOUS EVERY 8 HOURS
Refills: 0 | Status: DISCONTINUED | OUTPATIENT
Start: 2023-11-13 | End: 2023-11-16

## 2023-11-13 RX ORDER — ONDANSETRON 8 MG/1
4 TABLET, FILM COATED ORAL EVERY 8 HOURS
Refills: 0 | Status: DISCONTINUED | OUTPATIENT
Start: 2023-11-13 | End: 2023-11-16

## 2023-11-13 RX ORDER — MORPHINE SULFATE 50 MG/1
4 CAPSULE, EXTENDED RELEASE ORAL ONCE
Refills: 0 | Status: DISCONTINUED | OUTPATIENT
Start: 2023-11-13 | End: 2023-11-13

## 2023-11-13 RX ORDER — HEPARIN SODIUM 5000 [USP'U]/ML
5000 INJECTION INTRAVENOUS; SUBCUTANEOUS EVERY 6 HOURS
Refills: 0 | Status: DISCONTINUED | OUTPATIENT
Start: 2023-11-13 | End: 2023-11-15

## 2023-11-13 RX ADMIN — MORPHINE SULFATE 4 MILLIGRAM(S): 50 CAPSULE, EXTENDED RELEASE ORAL at 04:56

## 2023-11-13 RX ADMIN — HEPARIN SODIUM 2400 UNIT(S)/HR: 5000 INJECTION INTRAVENOUS; SUBCUTANEOUS at 07:31

## 2023-11-13 RX ADMIN — MORPHINE SULFATE 4 MILLIGRAM(S): 50 CAPSULE, EXTENDED RELEASE ORAL at 03:58

## 2023-11-13 RX ADMIN — HEPARIN SODIUM 2000 UNIT(S)/HR: 5000 INJECTION INTRAVENOUS; SUBCUTANEOUS at 12:58

## 2023-11-13 RX ADMIN — SODIUM CHLORIDE 3 MILLILITER(S): 9 INJECTION INTRAMUSCULAR; INTRAVENOUS; SUBCUTANEOUS at 05:11

## 2023-11-13 RX ADMIN — SODIUM CHLORIDE 3 MILLILITER(S): 9 INJECTION INTRAMUSCULAR; INTRAVENOUS; SUBCUTANEOUS at 14:06

## 2023-11-13 RX ADMIN — HEPARIN SODIUM 2000 UNIT(S)/HR: 5000 INJECTION INTRAVENOUS; SUBCUTANEOUS at 20:28

## 2023-11-13 RX ADMIN — SODIUM CHLORIDE 3 MILLILITER(S): 9 INJECTION INTRAMUSCULAR; INTRAVENOUS; SUBCUTANEOUS at 21:06

## 2023-11-13 RX ADMIN — HEPARIN SODIUM 2400 UNIT(S)/HR: 5000 INJECTION INTRAVENOUS; SUBCUTANEOUS at 04:00

## 2023-11-13 RX ADMIN — HEPARIN SODIUM 2000 UNIT(S)/HR: 5000 INJECTION INTRAVENOUS; SUBCUTANEOUS at 19:09

## 2023-11-13 RX ADMIN — HEPARIN SODIUM 10000 UNIT(S): 5000 INJECTION INTRAVENOUS; SUBCUTANEOUS at 03:59

## 2023-11-13 NOTE — PROGRESS NOTE ADULT - SUBJECTIVE AND OBJECTIVE BOX
HPI: 29 y/o female with hx of Asthma, Obesity who presented to ED c/o progressive global HA, fullness sensation in ears and neck for the past 2 weeks. Denies focal weakness, visual changes, fever, travel, or sick contacts. She was diagnosed with enterovirus 10/18 after presenting to ED with URI symptoms. Has been taking motrin/tylenol with little relief. Due to worsening of her symptoms she came to ED and was found to have venous sinus thrombosis on CT head/neck which was confirmed by MRV. She was started on Heparin gtt and seen by Neurosurgery. She denies any hx of VTE or rheumatological hx. She was neg for COVID on RVP on 10/18. Denies ever using OCP, denies smoking cigarettes but does admit to Vaping. No other complaints at this time.  (13 Nov 2023 04:38)    INTERVAL HPI/OVERNIGHT EVENTS:  28F c/o headache x 3 weeks with recent increase in severity to 10/10 with new decreased hearing right ear and tightness in her neck. Still has decreased hearing in the right ear today. Still has headache. Admits to photophobia. Denies OCP, history of injury, recent travel. Denies headache, weakness, numbness, n/v/d, fevers, chills, chest pain, SOB. Discussed plan for hematology eval and patient is agreeable. Pt has no further complaints at this time.    Vital Signs Last 24 Hrs  T(C): 36.4 (13 Nov 2023 08:27), Max: 37.1 (12 Nov 2023 23:33)  T(F): 97.5 (13 Nov 2023 08:27), Max: 98.7 (12 Nov 2023 23:33)  HR: 50 (13 Nov 2023 08:00) (50 - 78)  BP: 115/72 (13 Nov 2023 08:00) (115/72 - 142/95)  BP(mean): 84 (13 Nov 2023 08:00) (81 - 87)  RR: 18 (13 Nov 2023 08:00) (18 - 20)  SpO2: 96% (13 Nov 2023 08:00) (95% - 100%)    Parameters below as of 13 Nov 2023 08:00  Patient On (Oxygen Delivery Method): room air    PHYSICAL EXAM:  GENERAL: NAD, well-groomed  HEAD:  Atraumatic, normocephalic  WILY COMA SCORE: E- V- M- = 15  MENTAL STATUS: AAO x3; Opens eyes spontaneously; Appropriately conversant without aphasia; following simple commands  CRANIAL NERVES: PERRL. EOMI without nystagmus. Facial sensation decreased V1-3 distribution in the right, intact left. Face symmetric w/ normal eye closure and smile, tongue midline. Hearing grossly intact, mildly decreased on the right ear. Speech clear. Head turning and shoulder shrug intact.   MOTOR: strength 5/5 b/l upper and lower extremities  SENSATION: grossly intact to light touch all extremities  SKIN: Warm, dry; no rashes or lesions    LABS:                        14.4   10.40 )-----------( 278      ( 12 Nov 2023 18:05 )             43.5     11-12    139  |  102  |  11.6  ----------------------------<  81  4.1   |  27.0  |  0.63    Ca    9.1      12 Nov 2023 18:05    TPro  7.1  /  Alb  4.0  /  TBili  0.4  /  DBili  x   /  AST  17  /  ALT  35<H>  /  AlkPhos  61  11-12    PT/INR - ( 12 Nov 2023 23:49 )   PT: 11.2 sec;   INR: 1.01 ratio         PTT - ( 12 Nov 2023 23:49 )  PTT:30.9 sec  Urinalysis Basic - ( 12 Nov 2023 18:05 )    Color: x / Appearance: x / SG: x / pH: x  Gluc: 81 mg/dL / Ketone: x  / Bili: x / Urobili: x   Blood: x / Protein: x / Nitrite: x   Leuk Esterase: x / RBC: x / WBC x   Sq Epi: x / Non Sq Epi: x / Bacteria: x    RADIOLOGY & ADDITIONAL TESTS:    < from: MR Head w/wo IV Cont (11.13.23 @ 01:50) >  IMPRESSION:    Filling defects in the lateral right transverse sinus, sigmoid sinus and   proximal internal jugular vein compatible with sinus venous thrombosis.    No acute intracranial hemorrhage or infarct.    < from: MR Venogram Head w/ IV Cont (11.13.23 @ 01:50) >  IMPRESSION:    Filling defects in the lateral right transverse sinus, sigmoid sinus and   proximal internal jugular vein compatible with sinus venous thrombosis.    No acute intracranial hemorrhage or infarct.

## 2023-11-13 NOTE — PATIENT PROFILE ADULT - NSPROIMPLANTSMEDDEV_GEN_A_NUR
I have personally seen and examined the patient. I have collaborated with and supervised the
screw in left foot

## 2023-11-13 NOTE — PROGRESS NOTE ADULT - ASSESSMENT
28F c/o headache x 3 weeks with recent increase in severity to 10/10 with new decreased hearing right ear and tightness in her neck. MRI w+w/o and MRV show right transverse sinus, sigmoid sinus, and proximal IJV thrombosis. Negative for any acute intracranial hemorrhage or infarct.    Plan    - MRI/MRV do not indicate a need for any acute neurosurgical intervention  - Recommend hematology eval and work up for hypercoagulability  - Cleared by Neurosurgery for oral anticoagulation but managed by hematology  - Cleared to eat  - Neurosurgery to Sign off   - Follow up with Dr. Escobar in 2 weeks outpatient  - Discussed with Dr. Escobar

## 2023-11-13 NOTE — CONSULT NOTE ADULT - NS ATTEND AMEND GEN_ALL_CORE FT
Patient seen and examined at bedside.  MRI reviewed.  MRI shows nonocclusive thrombus within the right sigmoid and transverse sinus.  Recommend heparin infusion and hematology consultation for hypercoagulability work-up.  No further neurosurgical management indicated at this time.

## 2023-11-13 NOTE — ED ADULT NURSE REASSESSMENT NOTE - NS ED NURSE REASSESS COMMENT FT1
Pt connected to cardiac monitor and pulse ox. VSS. Pt reports right sided headache and ear pain. Pain medication administered per orders-see mar.

## 2023-11-13 NOTE — H&P ADULT - HISTORY OF PRESENT ILLNESS
27 y/o female with hx of Asthma, Obesity who presented to ED c/o progressive global HA, fullness sensation in ears and neck for the past 2 weeks. Denies focal weakness, visual changes, fever, travel, or sick contacts. She was diagnosed with enterovirus 10/18 after presenting to ED with URI symptoms. Has been taking motrin/tylenol with little relief. Due to worsening of her symptoms she came to ED and was found to have venous sinus thrombosis on CT head/neck which was confirmed by MRV. She was started on Heparin gtt and seen by Neurosurgery. She denies any hx of VTE or rheumatological hx. She was neg for COVID on RVP on 10/18. Denies ever using OCP, denies smoking cigarettes but does admit to Vaping. No other complaints at this time.

## 2023-11-13 NOTE — PROVIDER CONTACT NOTE (CRITICAL VALUE NOTIFICATION) - ACTION/TREATMENT ORDERED:
Critical aptt= 130.9. Dr. Francis made aware. No new orders given. Continue to regulate IV Heparin based on full anticoagulation nomogram. As per nomogram, hold heparin x1 hour and decrease dose by 4. IV heparin dose to resume at decreased rate of 20ml/hr after 1 hour hold.
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
## 2023-11-13 NOTE — H&P ADULT - ASSESSMENT
27 y/o female with venous sinus thrombosis, Hx of Obesity, Asthma    Venous sinus thrombosis:  -No provoking factors identified  -No hx of VTE/ Rheumatological disorders   -No family hx of VTE  -Not on OCP, no tobacco use, but does vap  -Cont. Heparin gtt, denies falls/bleeding  -Hematology eval-called  -check NICKO  -NPO for now pending final NeuroSx recommendations   -OOB, ambulate, fall risk on Heparin     Asthma:  -No hx of icu/intubations  -No wheezing on exam, normal RA sat  -Cont. PRN albuterol MDI    Obesity:  -Counseled on diet/exercise     Discussed with ED staff, Patient, Fiance at bedside with Patients permission  29 y/o female with venous sinus thrombosis, Hx of Obesity, Asthma    Venous sinus thrombosis:  -No provoking factors identified  -No hx of VTE/ Rheumatological disorders   -No family hx of VTE  -Not on OCP, no tobacco use, but does vap  -Cont. Heparin gtt, denies falls/bleeding  -SDU, Q2 hour neuro checks  -Monitor daily hbg/plt on Heparin gtt  -Hematology eval-called  -check NICKO  -NPO for now pending final NeuroSx recommendations   -OOB, ambulate, fall risk on Heparin     Asthma:  -No hx of icu/intubations  -No wheezing on exam, normal RA sat  -Cont. PRN albuterol MDI    Obesity:  -Counseled on diet/exercise     Discussed with ED staff, Patient, Fiance at bedside with Patients permission

## 2023-11-13 NOTE — CHART NOTE - NSCHARTNOTEFT_GEN_A_CORE
Patient is Seen and evaluated  Patient has some headache  denies any weakness or numbness   She is on heparin drip   MRI and MRV done showed Filling defects in the lateral right transverse sinus, sigmoid sinus and proximal internal jugular vein compatible with sinus venous thrombosis.  No acute intracranial hemorrhage or infarct.  Spoke with Neurosurgery  no further intervention  waiting for hematology eval   will start diet   off note her grand mother was diagnosed with DVT and PE

## 2023-11-13 NOTE — CONSULT NOTE ADULT - ASSESSMENT
Imp r/o transverse and sigmoid sinus thrombus    Plan coags, urgent MRI Brain +/- and MRV Imp r/o transverse and sigmoid sinus thrombus    Plan coags, factor V Leiden, urgent MRI Brain +/- and MRV

## 2023-11-14 LAB
ALBUMIN SERPL ELPH-MCNC: 3.4 G/DL — SIGNIFICANT CHANGE UP (ref 3.3–5.2)
ALBUMIN SERPL ELPH-MCNC: 3.4 G/DL — SIGNIFICANT CHANGE UP (ref 3.3–5.2)
ALP SERPL-CCNC: 51 U/L — SIGNIFICANT CHANGE UP (ref 40–120)
ALP SERPL-CCNC: 51 U/L — SIGNIFICANT CHANGE UP (ref 40–120)
ALT FLD-CCNC: 27 U/L — SIGNIFICANT CHANGE UP
ALT FLD-CCNC: 27 U/L — SIGNIFICANT CHANGE UP
ANION GAP SERPL CALC-SCNC: 10 MMOL/L — SIGNIFICANT CHANGE UP (ref 5–17)
ANION GAP SERPL CALC-SCNC: 10 MMOL/L — SIGNIFICANT CHANGE UP (ref 5–17)
APTT BLD: 85.7 SEC — HIGH (ref 24.5–35.6)
APTT BLD: 85.7 SEC — HIGH (ref 24.5–35.6)
AST SERPL-CCNC: 15 U/L — SIGNIFICANT CHANGE UP
AST SERPL-CCNC: 15 U/L — SIGNIFICANT CHANGE UP
BILIRUB SERPL-MCNC: 0.2 MG/DL — LOW (ref 0.4–2)
BILIRUB SERPL-MCNC: 0.2 MG/DL — LOW (ref 0.4–2)
BUN SERPL-MCNC: 14.4 MG/DL — SIGNIFICANT CHANGE UP (ref 8–20)
BUN SERPL-MCNC: 14.4 MG/DL — SIGNIFICANT CHANGE UP (ref 8–20)
CALCIUM SERPL-MCNC: 8.3 MG/DL — LOW (ref 8.4–10.5)
CALCIUM SERPL-MCNC: 8.3 MG/DL — LOW (ref 8.4–10.5)
CARDIOLIPIN AB SER-ACNC: NEGATIVE — SIGNIFICANT CHANGE UP
CARDIOLIPIN AB SER-ACNC: NEGATIVE — SIGNIFICANT CHANGE UP
CHLORIDE SERPL-SCNC: 104 MMOL/L — SIGNIFICANT CHANGE UP (ref 96–108)
CHLORIDE SERPL-SCNC: 104 MMOL/L — SIGNIFICANT CHANGE UP (ref 96–108)
CO2 SERPL-SCNC: 24 MMOL/L — SIGNIFICANT CHANGE UP (ref 22–29)
CO2 SERPL-SCNC: 24 MMOL/L — SIGNIFICANT CHANGE UP (ref 22–29)
CREAT SERPL-MCNC: 0.6 MG/DL — SIGNIFICANT CHANGE UP (ref 0.5–1.3)
CREAT SERPL-MCNC: 0.6 MG/DL — SIGNIFICANT CHANGE UP (ref 0.5–1.3)
EGFR: 127 ML/MIN/1.73M2 — SIGNIFICANT CHANGE UP
EGFR: 127 ML/MIN/1.73M2 — SIGNIFICANT CHANGE UP
FACT V ACT/NOR PPP: 87 % — SIGNIFICANT CHANGE UP (ref 50–150)
FACT V ACT/NOR PPP: 87 % — SIGNIFICANT CHANGE UP (ref 50–150)
GLUCOSE SERPL-MCNC: 105 MG/DL — HIGH (ref 70–99)
GLUCOSE SERPL-MCNC: 105 MG/DL — HIGH (ref 70–99)
HCT VFR BLD CALC: 37.8 % — SIGNIFICANT CHANGE UP (ref 34.5–45)
HCT VFR BLD CALC: 37.8 % — SIGNIFICANT CHANGE UP (ref 34.5–45)
HCYS SERPL-MCNC: 9.1 UMOL/L — SIGNIFICANT CHANGE UP
HCYS SERPL-MCNC: 9.1 UMOL/L — SIGNIFICANT CHANGE UP
HGB BLD-MCNC: 12.4 G/DL — SIGNIFICANT CHANGE UP (ref 11.5–15.5)
HGB BLD-MCNC: 12.4 G/DL — SIGNIFICANT CHANGE UP (ref 11.5–15.5)
INR BLD: 1.05 RATIO — SIGNIFICANT CHANGE UP (ref 0.85–1.18)
INR BLD: 1.05 RATIO — SIGNIFICANT CHANGE UP (ref 0.85–1.18)
MAGNESIUM SERPL-MCNC: 1.9 MG/DL — SIGNIFICANT CHANGE UP (ref 1.6–2.6)
MAGNESIUM SERPL-MCNC: 1.9 MG/DL — SIGNIFICANT CHANGE UP (ref 1.6–2.6)
MCHC RBC-ENTMCNC: 27.7 PG — SIGNIFICANT CHANGE UP (ref 27–34)
MCHC RBC-ENTMCNC: 27.7 PG — SIGNIFICANT CHANGE UP (ref 27–34)
MCHC RBC-ENTMCNC: 32.8 GM/DL — SIGNIFICANT CHANGE UP (ref 32–36)
MCHC RBC-ENTMCNC: 32.8 GM/DL — SIGNIFICANT CHANGE UP (ref 32–36)
MCV RBC AUTO: 84.4 FL — SIGNIFICANT CHANGE UP (ref 80–100)
MCV RBC AUTO: 84.4 FL — SIGNIFICANT CHANGE UP (ref 80–100)
PHOSPHATE SERPL-MCNC: 2.8 MG/DL — SIGNIFICANT CHANGE UP (ref 2.4–4.7)
PHOSPHATE SERPL-MCNC: 2.8 MG/DL — SIGNIFICANT CHANGE UP (ref 2.4–4.7)
PLATELET # BLD AUTO: 268 K/UL — SIGNIFICANT CHANGE UP (ref 150–400)
PLATELET # BLD AUTO: 268 K/UL — SIGNIFICANT CHANGE UP (ref 150–400)
POTASSIUM SERPL-MCNC: 4.2 MMOL/L — SIGNIFICANT CHANGE UP (ref 3.5–5.3)
POTASSIUM SERPL-MCNC: 4.2 MMOL/L — SIGNIFICANT CHANGE UP (ref 3.5–5.3)
POTASSIUM SERPL-SCNC: 4.2 MMOL/L — SIGNIFICANT CHANGE UP (ref 3.5–5.3)
POTASSIUM SERPL-SCNC: 4.2 MMOL/L — SIGNIFICANT CHANGE UP (ref 3.5–5.3)
PROT C ACT/NOR PPP: 117 % — SIGNIFICANT CHANGE UP (ref 74–150)
PROT C ACT/NOR PPP: 117 % — SIGNIFICANT CHANGE UP (ref 74–150)
PROT S FREE AG PPP IA-ACNC: 103 % — SIGNIFICANT CHANGE UP (ref 61–131)
PROT S FREE AG PPP IA-ACNC: 103 % — SIGNIFICANT CHANGE UP (ref 61–131)
PROT SERPL-MCNC: 6 G/DL — LOW (ref 6.6–8.7)
PROT SERPL-MCNC: 6 G/DL — LOW (ref 6.6–8.7)
PROTHROM AB SERPL-ACNC: 11.6 SEC — SIGNIFICANT CHANGE UP (ref 9.5–13)
PROTHROM AB SERPL-ACNC: 11.6 SEC — SIGNIFICANT CHANGE UP (ref 9.5–13)
RBC # BLD: 4.48 M/UL — SIGNIFICANT CHANGE UP (ref 3.8–5.2)
RBC # BLD: 4.48 M/UL — SIGNIFICANT CHANGE UP (ref 3.8–5.2)
RBC # FLD: 13.6 % — SIGNIFICANT CHANGE UP (ref 10.3–14.5)
RBC # FLD: 13.6 % — SIGNIFICANT CHANGE UP (ref 10.3–14.5)
SODIUM SERPL-SCNC: 138 MMOL/L — SIGNIFICANT CHANGE UP (ref 135–145)
SODIUM SERPL-SCNC: 138 MMOL/L — SIGNIFICANT CHANGE UP (ref 135–145)
WBC # BLD: 9.11 K/UL — SIGNIFICANT CHANGE UP (ref 3.8–10.5)
WBC # BLD: 9.11 K/UL — SIGNIFICANT CHANGE UP (ref 3.8–10.5)
WBC # FLD AUTO: 9.11 K/UL — SIGNIFICANT CHANGE UP (ref 3.8–10.5)
WBC # FLD AUTO: 9.11 K/UL — SIGNIFICANT CHANGE UP (ref 3.8–10.5)

## 2023-11-14 PROCEDURE — 99232 SBSQ HOSP IP/OBS MODERATE 35: CPT

## 2023-11-14 PROCEDURE — G0452: CPT | Mod: 26

## 2023-11-14 RX ORDER — POLYETHYLENE GLYCOL 3350 17 G/17G
17 POWDER, FOR SOLUTION ORAL DAILY
Refills: 0 | Status: DISCONTINUED | OUTPATIENT
Start: 2023-11-14 | End: 2023-11-16

## 2023-11-14 RX ORDER — SENNA PLUS 8.6 MG/1
2 TABLET ORAL AT BEDTIME
Refills: 0 | Status: DISCONTINUED | OUTPATIENT
Start: 2023-11-14 | End: 2023-11-16

## 2023-11-14 RX ADMIN — SODIUM CHLORIDE 3 MILLILITER(S): 9 INJECTION INTRAMUSCULAR; INTRAVENOUS; SUBCUTANEOUS at 06:35

## 2023-11-14 RX ADMIN — HEPARIN SODIUM 2000 UNIT(S)/HR: 5000 INJECTION INTRAVENOUS; SUBCUTANEOUS at 19:43

## 2023-11-14 RX ADMIN — HEPARIN SODIUM 2000 UNIT(S)/HR: 5000 INJECTION INTRAVENOUS; SUBCUTANEOUS at 16:37

## 2023-11-14 RX ADMIN — HEPARIN SODIUM 2000 UNIT(S)/HR: 5000 INJECTION INTRAVENOUS; SUBCUTANEOUS at 04:15

## 2023-11-14 RX ADMIN — SODIUM CHLORIDE 3 MILLILITER(S): 9 INJECTION INTRAMUSCULAR; INTRAVENOUS; SUBCUTANEOUS at 21:06

## 2023-11-14 RX ADMIN — HEPARIN SODIUM 2000 UNIT(S)/HR: 5000 INJECTION INTRAVENOUS; SUBCUTANEOUS at 16:59

## 2023-11-14 RX ADMIN — SODIUM CHLORIDE 3 MILLILITER(S): 9 INJECTION INTRAMUSCULAR; INTRAVENOUS; SUBCUTANEOUS at 13:35

## 2023-11-14 RX ADMIN — Medication 3 MILLIGRAM(S): at 20:20

## 2023-11-14 RX ADMIN — ONDANSETRON 4 MILLIGRAM(S): 8 TABLET, FILM COATED ORAL at 08:15

## 2023-11-14 NOTE — PROGRESS NOTE ADULT - SUBJECTIVE AND OBJECTIVE BOX
ASHLYN DOLL    [id number]    28y      Female    Patient is a 28y old  Female who presents with a chief complaint of sinus venous thrombosis (13 Nov 2023 10:29)      INTERVAL HPI/OVERNIGHT EVENTS:    Patient still have some headache but better then yesterday, Percocet is helping her, denies fever, chills, chest pain, nausea, vomiting.       Vital Signs Last 24 Hrs  T(C): 36.4 (14 Nov 2023 07:45), Max: 36.7 (13 Nov 2023 19:40)  T(F): 97.6 (14 Nov 2023 07:45), Max: 98 (13 Nov 2023 19:40)  HR: 55 (14 Nov 2023 12:00) (48 - 70)  BP: 123/71 (14 Nov 2023 12:00) (108/64 - 123/71)  BP(mean): 86 (14 Nov 2023 12:00) (64 - 86)  RR: 13 (14 Nov 2023 12:00) (13 - 20)  SpO2: 96% (14 Nov 2023 12:00) (94% - 99%)    Parameters below as of 14 Nov 2023 12:00  Patient On (Oxygen Delivery Method): room air        PHYSICAL EXAM:    GENERAL: Young female looking some what uncomfortable    HEENT: Atraumatic   NECK: soft, Supple, No JVD  CHEST/LUNG: Clear to auscultate bilaterally; No wheezing  HEART: S1S2+, Regular rate and rhythm; No murmurs  ABDOMEN: Soft, Nontender, Nondistended; Bowel sounds present  EXTREMITIES:  1+ Peripheral Pulses, No edema  SKIN: No rashes or lesions  NEURO: AAOX3  PSYCH: normal mood      LABS:                        12.4   9.11  )-----------( 268      ( 14 Nov 2023 03:45 )             37.8     11-14    138  |  104  |  14.4  ----------------------------<  105<H>  4.2   |  24.0  |  0.60    Ca    8.3<L>      14 Nov 2023 03:45  Phos  2.8     11-14  Mg     1.9     11-14    TPro  6.0<L>  /  Alb  3.4  /  TBili  0.2<L>  /  DBili  x   /  AST  15  /  ALT  27  /  AlkPhos  51  11-14    PT/INR - ( 14 Nov 2023 03:45 )   PT: 11.6 sec;   INR: 1.05 ratio         PTT - ( 14 Nov 2023 03:45 )  PTT:85.7 sec        I&O's Summary    13 Nov 2023 07:01  -  14 Nov 2023 07:00  --------------------------------------------------------  IN: 590 mL / OUT: 0 mL / NET: 590 mL        MEDICATIONS  (STANDING):  heparin  Infusion. 2000 Unit(s)/Hr (20 mL/Hr) IV Continuous <Continuous>  influenza   Vaccine 0.5 milliLiter(s) IntraMuscular once  sodium chloride 0.9% lock flush 3 milliLiter(s) IV Push every 8 hours    MEDICATIONS  (PRN):  acetaminophen     Tablet .. 650 milliGRAM(s) Oral every 6 hours PRN Temp greater or equal to 38C (100.4F), Mild Pain (1 - 3)  aluminum hydroxide/magnesium hydroxide/simethicone Suspension 30 milliLiter(s) Oral every 4 hours PRN Dyspepsia  heparin   Injectable 75324 Unit(s) IV Push every 6 hours PRN For aPTT less than 40  heparin   Injectable 5000 Unit(s) IV Push every 6 hours PRN For aPTT between 40 - 57  melatonin 3 milliGRAM(s) Oral at bedtime PRN Insomnia  ondansetron Injectable 4 milliGRAM(s) IV Push every 8 hours PRN Nausea and/or Vomiting  oxycodone    5 mG/acetaminophen 325 mG 1 Tablet(s) Oral every 4 hours PRN Moderate to sever  headach and pain
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
## 2023-11-15 LAB
ANA TITR SER: NEGATIVE — SIGNIFICANT CHANGE UP
ANA TITR SER: NEGATIVE — SIGNIFICANT CHANGE UP
APTT BLD: 120.2 SEC — CRITICAL HIGH (ref 24.5–35.6)
APTT BLD: 120.2 SEC — CRITICAL HIGH (ref 24.5–35.6)
APTT BLD: 36.2 SEC — HIGH (ref 24.5–35.6)
APTT BLD: 36.2 SEC — HIGH (ref 24.5–35.6)
APTT BLD: 81.7 SEC — HIGH (ref 24.5–35.6)
APTT BLD: 81.7 SEC — HIGH (ref 24.5–35.6)
AT III ACT/NOR PPP CHRO: 79 % — LOW (ref 85–135)
AT III ACT/NOR PPP CHRO: 79 % — LOW (ref 85–135)
AT III AG PPP IA-MCNC: 22 MG/DL — SIGNIFICANT CHANGE UP (ref 19–31)
AT III AG PPP IA-MCNC: 22 MG/DL — SIGNIFICANT CHANGE UP (ref 19–31)
B2 GLYCOPROT1 AB SER QL: NEGATIVE — SIGNIFICANT CHANGE UP
B2 GLYCOPROT1 AB SER QL: NEGATIVE — SIGNIFICANT CHANGE UP
B2 GLYCOPROT1 AB SER QL: POSITIVE
B2 GLYCOPROT1 AB SER QL: POSITIVE
B2 GLYCOPROT1 IGG SER-ACNC: 23 SGU — HIGH
B2 GLYCOPROT1 IGG SER-ACNC: 23 SGU — HIGH
B2 GLYCOPROT1 IGM SER-ACNC: <5 SMU — SIGNIFICANT CHANGE UP
B2 GLYCOPROT1 IGM SER-ACNC: <5 SMU — SIGNIFICANT CHANGE UP
DRVVT RATIO: 1.04 RATIO — SIGNIFICANT CHANGE UP (ref 0–1.21)
DRVVT RATIO: 1.04 RATIO — SIGNIFICANT CHANGE UP (ref 0–1.21)
DRVVT SCREEN TO CONFIRM RATIO: SIGNIFICANT CHANGE UP
DRVVT SCREEN TO CONFIRM RATIO: SIGNIFICANT CHANGE UP
HCT VFR BLD CALC: 38.4 % — SIGNIFICANT CHANGE UP (ref 34.5–45)
HCT VFR BLD CALC: 38.4 % — SIGNIFICANT CHANGE UP (ref 34.5–45)
HGB BLD-MCNC: 12.6 G/DL — SIGNIFICANT CHANGE UP (ref 11.5–15.5)
HGB BLD-MCNC: 12.6 G/DL — SIGNIFICANT CHANGE UP (ref 11.5–15.5)
MCHC RBC-ENTMCNC: 28.1 PG — SIGNIFICANT CHANGE UP (ref 27–34)
MCHC RBC-ENTMCNC: 28.1 PG — SIGNIFICANT CHANGE UP (ref 27–34)
MCHC RBC-ENTMCNC: 32.8 GM/DL — SIGNIFICANT CHANGE UP (ref 32–36)
MCHC RBC-ENTMCNC: 32.8 GM/DL — SIGNIFICANT CHANGE UP (ref 32–36)
MCV RBC AUTO: 85.5 FL — SIGNIFICANT CHANGE UP (ref 80–100)
MCV RBC AUTO: 85.5 FL — SIGNIFICANT CHANGE UP (ref 80–100)
PLATELET # BLD AUTO: 234 K/UL — SIGNIFICANT CHANGE UP (ref 150–400)
PLATELET # BLD AUTO: 234 K/UL — SIGNIFICANT CHANGE UP (ref 150–400)
PROT C ACT/NOR PPP: 119 % — SIGNIFICANT CHANGE UP (ref 74–150)
PROT C ACT/NOR PPP: 119 % — SIGNIFICANT CHANGE UP (ref 74–150)
RBC # BLD: 4.49 M/UL — SIGNIFICANT CHANGE UP (ref 3.8–5.2)
RBC # BLD: 4.49 M/UL — SIGNIFICANT CHANGE UP (ref 3.8–5.2)
RBC # FLD: 13.8 % — SIGNIFICANT CHANGE UP (ref 10.3–14.5)
RBC # FLD: 13.8 % — SIGNIFICANT CHANGE UP (ref 10.3–14.5)
WBC # BLD: 8.1 K/UL — SIGNIFICANT CHANGE UP (ref 3.8–10.5)
WBC # BLD: 8.1 K/UL — SIGNIFICANT CHANGE UP (ref 3.8–10.5)
WBC # FLD AUTO: 8.1 K/UL — SIGNIFICANT CHANGE UP (ref 3.8–10.5)
WBC # FLD AUTO: 8.1 K/UL — SIGNIFICANT CHANGE UP (ref 3.8–10.5)

## 2023-11-15 PROCEDURE — 99233 SBSQ HOSP IP/OBS HIGH 50: CPT | Mod: GC

## 2023-11-15 RX ORDER — DIPHENHYDRAMINE HCL 50 MG
25 CAPSULE ORAL
Refills: 0 | Status: DISCONTINUED | OUTPATIENT
Start: 2023-11-15 | End: 2023-11-16

## 2023-11-15 RX ORDER — APIXABAN 2.5 MG/1
10 TABLET, FILM COATED ORAL EVERY 12 HOURS
Refills: 0 | Status: DISCONTINUED | OUTPATIENT
Start: 2023-11-15 | End: 2023-11-16

## 2023-11-15 RX ORDER — GABAPENTIN 400 MG/1
300 CAPSULE ORAL THREE TIMES A DAY
Refills: 0 | Status: DISCONTINUED | OUTPATIENT
Start: 2023-11-15 | End: 2023-11-16

## 2023-11-15 RX ADMIN — ONDANSETRON 4 MILLIGRAM(S): 8 TABLET, FILM COATED ORAL at 21:47

## 2023-11-15 RX ADMIN — APIXABAN 10 MILLIGRAM(S): 2.5 TABLET, FILM COATED ORAL at 17:37

## 2023-11-15 RX ADMIN — SODIUM CHLORIDE 3 MILLILITER(S): 9 INJECTION INTRAMUSCULAR; INTRAVENOUS; SUBCUTANEOUS at 05:20

## 2023-11-15 RX ADMIN — Medication 3 MILLIGRAM(S): at 21:47

## 2023-11-15 RX ADMIN — SODIUM CHLORIDE 3 MILLILITER(S): 9 INJECTION INTRAMUSCULAR; INTRAVENOUS; SUBCUTANEOUS at 21:51

## 2023-11-15 RX ADMIN — GABAPENTIN 300 MILLIGRAM(S): 400 CAPSULE ORAL at 14:58

## 2023-11-15 RX ADMIN — HEPARIN SODIUM 1700 UNIT(S)/HR: 5000 INJECTION INTRAVENOUS; SUBCUTANEOUS at 15:35

## 2023-11-15 RX ADMIN — GABAPENTIN 300 MILLIGRAM(S): 400 CAPSULE ORAL at 21:46

## 2023-11-15 RX ADMIN — HEPARIN SODIUM 2000 UNIT(S)/HR: 5000 INJECTION INTRAVENOUS; SUBCUTANEOUS at 06:40

## 2023-11-15 RX ADMIN — ONDANSETRON 4 MILLIGRAM(S): 8 TABLET, FILM COATED ORAL at 08:30

## 2023-11-15 RX ADMIN — HEPARIN SODIUM 2000 UNIT(S)/HR: 5000 INJECTION INTRAVENOUS; SUBCUTANEOUS at 07:15

## 2023-11-15 RX ADMIN — HEPARIN SODIUM 1700 UNIT(S)/HR: 5000 INJECTION INTRAVENOUS; SUBCUTANEOUS at 07:41

## 2023-11-15 RX ADMIN — SENNA PLUS 2 TABLET(S): 8.6 TABLET ORAL at 21:46
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
## 2023-11-15 NOTE — CONSULT NOTE ADULT - ASSESSMENT
27 y/o female with hx of Asthma, Obesity who presented to ED c/o progressive global HA,   found with venous sinus thrombsus  on hep gtt  pending workup      Med recs:  - Recommend starting gabapentin PO 300mg [id number] standing. HOLD for sedation   cont percocet prn

## 2023-11-15 NOTE — PROGRESS NOTE ADULT - SUBJECTIVE AND OBJECTIVE BOX
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 ED with right sided headache x 1 week, now c/o right side neck and ear pain, c/o trouble swallowing, taking Tylenol and Motrin without relief. Has to been to [facility] ED multiple times in the past year. Seen in ED one week ago for nasal congestion x 1 week, associated with intermittent fevers and difficulty taking a deep breath. Denies focal weakness, visual changes, fever, travel, or sick contacts. She was diagnosed with enterovirus 10/18 after presenting to ED with URI symptoms. Has been taking motrin/tylenol with little relief. Due to worsening of her symptoms she came to ED and was found to have venous sinus thrombosis on CT head/neck which was confirmed by MRV. She was started on Heparin gtt and seen by Neurosurgery. She denies any hx of VTE or rheumatological hx. She was neg for COVID on RVP on 10/18. Denies ever using OCP, denies smoking cigarettes but does admit to Vaping. No other complaints at this time.     24 hrs:   Today is hospital day 2d, and this morning she is AOx3 and reports a constant 9/10 headache that has been occurring since this admission and right ear fullness, but not tinnitus. The headache is 9/10 in severity, constant, piercing, associated with photophobia mainly in right eye, and localized to the right side of her head and behind her right eye, non-radiating. She admits to blurry vision in her R eye. Tylenol/ibuprofen do not help, percocet helps slightly. She admits to nausea and constant dizziness but no vomiting. She denies hand swelling/stiffness/pain but admits to occasional rash in the malar distribution on her face. She had a small bowel movement last night, no appetite changes. She is able to ambulate.       REVIEW OF SYSTEMS:    CONSTITUTIONAL: + right sided weakness, denies fevers or chills  EYES/ENT: + visual changes;  No vertigo or throat pain   NECK: No pain, +stiffness  RESPIRATORY: No cough, wheezing, + shortness of breath  CARDIOVASCULAR: + chest pain, denies palpitations  GASTROINTESTINAL: No abdominal or epigastric pain. + nausea, no vomiting, or hematemesis; No diarrhea or constipation. No melena or hematochezia.  GENITOURINARY: No dysuria, frequency or hematuria  NEUROLOGICAL: No numbness, +weakness  All other review of systems is negative unless indicated above.    OBJECTIVE  PAST MEDICAL & SURGICAL HISTORY  Asthma  as a child  MRSA (methicillin resistant Staphylococcus aureus)  Obesity  Fracture of bone  left 5th metatarsal  H/O toe surgery      Home Meds:  ibuprofen 600 mg oral tablet: 1 tab(s) orally every 6 hours prn pain   oxyCODONE-acetaminophen 5mg-325mg oral tablet: 1 tab(s) orally every 6 hours, As Needed MDD:4  Ventolin HFA 90 mcg/inh inhalation aerosol: 2 puff(s) inhaled every 6 hours    ALLERGIES:  No Known Allergies    MEDICATIONS:  STANDING MEDICATIONS  heparin  Infusion. 2000 Unit(s)/Hr IV Continuous <Continuous>  influenza   Vaccine 0.5 milliLiter(s) IntraMuscular once  polyethylene glycol 3350 17 Gram(s) Oral daily  senna 2 Tablet(s) Oral at bedtime  sodium chloride 0.9% lock flush 3 milliLiter(s) IV Push every 8 hours    PRN MEDICATIONS  acetaminophen     Tablet .. 650 milliGRAM(s) Oral every 6 hours PRN  aluminum hydroxide/magnesium hydroxide/simethicone Suspension 30 milliLiter(s) Oral every 4 hours PRN  heparin   Injectable 78948 Unit(s) IV Push every 6 hours PRN  heparin   Injectable 5000 Unit(s) IV Push every 6 hours PRN  melatonin 3 milliGRAM(s) Oral at bedtime PRN  ondansetron Injectable 4 milliGRAM(s) IV Push every 8 hours PRN  oxycodone    5 mG/acetaminophen 325 mG 1 Tablet(s) Oral every 4 hours PRN      VITAL SIGNS: Last 24 Hours  T(C): 36.4 (15 Nov 2023 04:45), Max: 36.7 (15 Nov 2023 00:03)  T(F): 97.5 (15 Nov 2023 04:45), Max: 98 (15 Nov 2023 00:03)  HR: 49 (15 Nov 2023 04:45) (48 - 60)  BP: 107/66 (15 Nov 2023 04:45) (102/58 - 123/71)  BP(mean): 71 (14 Nov 2023 16:30) (64 - 86)  RR: 18 (15 Nov 2023 04:45) (12 - 18)  SpO2: 97% (15 Nov 2023 04:45) (95% - 100%)    LABS:                                   12.6   8.10  )-----------( 234      ( 15 Nov 2023 06:48 )             38.4     11-14    138  |  104  |  14.4  ----------------------------<  105<H>  4.2   |  24.0  |  0.60    Ca    8.3<L>      14 Nov 2023 03:45  Phos  2.8     11-14  Mg     1.9     11-14    TPro  6.0<L>  /  Alb  3.4  /  TBili  0.2<L>  /  DBili  x   /  AST  15  /  ALT  27  /  AlkPhos  51  11-14    LIVER FUNCTIONS - ( 14 Nov 2023 03:45 )  Alb: 3.4 g/dL / Pro: 6.0 g/dL / ALK PHOS: 51 U/L / ALT: 27 U/L / AST: 15 U/L / GGT: x           PT/INR - ( 14 Nov 2023 03:45 )   PT: 11.6 sec;   INR: 1.05 ratio         PTT - ( 15 Nov 2023 06:48 )  PTT:120.2 sec  Urinalysis Basic - ( 14 Nov 2023 03:45 )    Color: x / Appearance: x / SG: x / pH: x  Gluc: 105 mg/dL / Ketone: x  / Bili: x / Urobili: x   Blood: x / Protein: x / Nitrite: x   Leuk Esterase: x / RBC: x / WBC x   Sq Epi: x / Non Sq Epi: x / Bacteria: x        RADIOLOGY:  < from: MR Venogram Head w/ IV Cont (11.13.23 @ 01:50) >  Filling defects in the lateral right transverse sinus, sigmoid sinus and   proximal internal jugular vein compatible with sinus venous thrombosis.    No acute intracranial hemorrhage or infarct.    < end of copied text >      PHYSICAL EXAM:  General: Obese woman lying in bed with slight discomfort   HEENT: atraumatic, normocephalic  Pulmonary: clear to auscultation bilaterally; No wheeze  Cardiovascular: Regular rate and rhythm; no murmurs, rubs or gallops. Normal S1S2  Gastrointestinal: Soft, nontender, nondistended; bowel sounds present  Musculoskeletal: 2+ peripheral pulses, no clubbing, cyanosis or edema  Neurology: Pt. alert and oriented, fluent speech, able to move all extremities, diminished sensation to touch in CN V distribution on R. All other CN grossly intact. 5/5 muscle strength in UE/LE. - macy hallpike, - romberg.  Skin: no rashes or lesions
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
## 2023-11-15 NOTE — CONSULT NOTE ADULT - SUBJECTIVE AND OBJECTIVE BOX
HPI: 28F c/o headache x 3 weeks with recent increase in severity to 10/10 with new decreased hearing right ear and tightness in her neck.  Denies OCP, history of injury, recent travel.  Denies N/V/D admits to photophobia.        PAST MEDICAL & SURGICAL HISTORY:  Asthma  as a child      MRSA (methicillin resistant Staphylococcus aureus)      Obesity      Fracture of bone  left 5th metatarsal        FAMILY HISTORY:      HOME MEDICATIONS:  Home Medications:      MEDICATIONS:  Antibiotics:    Neuro:    Anticoagulation:    OTHER:    IVF:        Allergies    No Known Allergies    Intolerances          SOCIAL HISTORY:  Tobacco Use: vape  EtOH use:   Substance:    LABS:                        14.4   10.40 )-----------( 278      ( 12 Nov 2023 18:05 )             43.5     11-12    139  |  102  |  11.6  ----------------------------<  81  4.1   |  27.0  |  0.63    Ca    9.1      12 Nov 2023 18:05    TPro  7.1  /  Alb  4.0  /  TBili  0.4  /  DBili  x   /  AST  17  /  ALT  35<H>  /  AlkPhos  61  11-12      Urinalysis Basic - ( 12 Nov 2023 18:05 )    Color: x / Appearance: x / SG: x / pH: x  Gluc: 81 mg/dL / Ketone: x  / Bili: x / Urobili: x   Blood: x / Protein: x / Nitrite: x   Leuk Esterase: x / RBC: x / WBC x   Sq Epi: x / Non Sq Epi: x / Bacteria: x      RADIOLOGY & ADDITIONAL STUDIES:  CTH no hemorrhage, hyperdensity inferior rt parietal  CT Head & Neck + contrast - question of flow void in Rt transverse and sigmoid sinus        REVIEW OF SYSTEMS  RESPIRATORY: No cough, wheezing, chills or hemoptysis; No shortness of breath  CARDIOVASCULAR: No chest pain, palpitations, dizziness, or leg swelling  GASTROINTESTINAL: No abdominal or epigastric pain. No nausea, vomiting, or hematemesis; No diarrhea or constipation. No melena or hematochezia.  GENITOURINARY: No dysuria, frequency, hematuria, or incontinence      Vital Signs Last 24 Hrs  T(C): 36.6 (13 Nov 2023 00:00), Max: 37.1 (12 Nov 2023 23:33)  T(F): 97.9 (13 Nov 2023 00:00), Max: 98.7 (12 Nov 2023 23:33)  HR: 74 (13 Nov 2023 00:00) (53 - 74)  BP: 138/83 (13 Nov 2023 00:00) (137/83 - 142/95)  BP(mean): --  RR: 19 (13 Nov 2023 00:00) (18 - 19)  SpO2: 97% (13 Nov 2023 00:00) (97% - 100%)    Parameters below as of 13 Nov 2023 00:00  Patient On (Oxygen Delivery Method): room air          PHYSICAL EXAM:  GENERAL: NAD, well-groomed  HEAD:  Atraumatic, normocephalic  MENTAL STATUS: AAO x3;  CRANIAL NERVES:  EOMI Smile symmetrical tongue midline decr sens Rt V123 decr hearing right side head turn shoulder shrug ok  no drift   M5/5 UE LE b/l  
HPI:  27 y/o female with hx of Asthma, Obesity who presented to ED c/o progressive global HA, fullness sensation in ears and neck for the past 2 weeks. Denies focal weakness, visual changes, fever, travel, or sick contacts. She was diagnosed with enterovirus 10/18 after presenting to ED with URI symptoms. Has been taking motrin/tylenol with little relief. Due to worsening of her symptoms she came to ED and was found to have venous sinus thrombosis on CT head/neck which was confirmed by MRV. She was started on Heparin gtt and seen by Neurosurgery. She denies any hx of VTE or rheumatological hx. She was neg for COVID on RVP on 10/18. Denies ever using OCP, denies smoking cigarettes but does admit to Vaping. No other complaints at this time.  (13 Nov 2023 04:38)    11/13/23   MR / MRV BRAIN: Filling defects in the lateral right transverse sinus, sigmoid sinus and  proximal internal jugular vein compatible with sinus venous thrombosis.  No acute intracranial hemorrhage or infarct.      REVIEW OF SYSTEMS:  Constitutional, Eyes, ENT, Cardiovascular, Respiratory, Gastrointestinal, Genitourinary, Musculoskeletal, Integumentary, Neurological, Psychiatric, Endocrine, Heme/Lymph, and Allergic/Immunologic review of systems are otherwise negative except as noted in the HPI.    PAST MEDICAL & SURGICAL HISTORY:  Asthma  as a child      MRSA (methicillin resistant Staphylococcus aureus)      Obesity      Fracture of bone  left 5th metatarsal      H/O toe surgery          FAMILY HISTORY:  No pertinent family history in first degree relatives        SOCIAL HISTORY:    Allergies    No Known Allergies    Intolerances        MEDICATIONS  (STANDING):  heparin  Infusion.  Unit(s)/Hr (24 mL/Hr) IV Continuous <Continuous>  influenza   Vaccine 0.5 milliLiter(s) IntraMuscular once  sodium chloride 0.9% lock flush 3 milliLiter(s) IV Push every 8 hours    MEDICATIONS  (PRN):  acetaminophen     Tablet .. 650 milliGRAM(s) Oral every 6 hours PRN Temp greater or equal to 38C (100.4F), Mild Pain (1 - 3)  aluminum hydroxide/magnesium hydroxide/simethicone Suspension 30 milliLiter(s) Oral every 4 hours PRN Dyspepsia  heparin   Injectable 67778 Unit(s) IV Push every 6 hours PRN For aPTT less than 40  heparin   Injectable 5000 Unit(s) IV Push every 6 hours PRN For aPTT between 40 - 57  melatonin 3 milliGRAM(s) Oral at bedtime PRN Insomnia  ondansetron Injectable 4 milliGRAM(s) IV Push every 8 hours PRN Nausea and/or Vomiting  oxycodone    5 mG/acetaminophen 325 mG 1 Tablet(s) Oral every 4 hours PRN Moderate to sever  headach and pain      Vital Signs Last 24 Hrs  T(C): 36.4 (13 Nov 2023 08:27), Max: 37.1 (12 Nov 2023 23:33)  T(F): 97.5 (13 Nov 2023 08:27), Max: 98.7 (12 Nov 2023 23:33)  HR: 50 (13 Nov 2023 08:00) (50 - 78)  BP: 115/72 (13 Nov 2023 08:00) (115/72 - 142/95)  BP(mean): 84 (13 Nov 2023 08:00) (81 - 87)  RR: 18 (13 Nov 2023 08:00) (18 - 20)  SpO2: 96% (13 Nov 2023 08:00) (95% - 100%)    Parameters below as of 13 Nov 2023 08:00  Patient On (Oxygen Delivery Method): room air        PHYSICAL EXAM:    GENERAL: NAD, well-groomed, well-developed  HEAD:  Atraumatic, Normocephalic  EYES: EOMI, PERRLA, conjunctiva and sclera clear  ENMT: No tonsillar erythema, exudates, or enlargement; Moist mucous membranes, Good dentition, No lesions  NECK: Supple, No JVD, Normal thyroid  NERVOUS SYSTEM:  Alert & Oriented X3, Good concentration; Motor Strength 5/5 B/L upper and lower extremities; DTRs 2+ intact and symmetric  CHEST/LUNG: Clear to auscultation bilaterally; No rales, rhonchi, wheezing, or rubs  HEART: Regular rate and rhythm; No murmurs, rubs, or gallops  ABDOMEN: Soft, Nontender, Nondistended; Bowel sounds present  EXTREMITIES:  2+ Peripheral Pulses, No clubbing, cyanosis, or edema  LYMPH: No lymphadenopathy noted  SKIN: No rashes or lesions      LABS:                        14.4   10.40 )-----------( 278      ( 12 Nov 2023 18:05 )             43.5     11-12    139  |  102  |  11.6  ----------------------------<  81  4.1   |  27.0  |  0.63    Ca    9.1      12 Nov 2023 18:05    TPro  7.1  /  Alb  4.0  /  TBili  0.4  /  DBili  x   /  AST  17  /  ALT  35<H>  /  AlkPhos  61  11-12    PT/INR - ( 12 Nov 2023 23:49 )   PT: 11.2 sec;   INR: 1.01 ratio         PTT - ( 12 Nov 2023 23:49 )  PTT:30.9 sec  Urinalysis Basic - ( 12 Nov 2023 18:05 )    Color: x / Appearance: x / SG: x / pH: x  Gluc: 81 mg/dL / Ketone: x  / Bili: x / Urobili: x   Blood: x / Protein: x / Nitrite: x   Leuk Esterase: x / RBC: x / WBC x   Sq Epi: x / Non Sq Epi: x / Bacteria: x          RADIOLOGY & ADDITIONAL STUDIES:    PATHOLOGY:    
Patient is a 28y old  Female who presents with a chief complaint of sinus venous thrombosis (15 Nov 2023 06:43)      HPI:  27 y/o female with hx of Asthma, Obesity who presented to ED c/o progressive global HA, fullness sensation in ears and neck for the past 2 weeks. Denies focal weakness, visual changes, fever, travel, or sick contacts. She was diagnosed with enterovirus 10/18 after presenting to ED with URI symptoms. Has been taking motrin/tylenol with little relief. Due to worsening of her symptoms she came to ED and was found to have venous sinus thrombosis on CT head/neck which was confirmed by MRV. She was started on Heparin gtt and seen by Neurosurgery. She denies any hx of VTE or rheumatological hx. She was neg for COVID on RVP on 10/18. Denies ever using OCP, denies smoking cigarettes but does admit to Vaping. No other complaints at this time.  (13 Nov 2023 04:38)            Analgesic Dosing for past 24 hours reviewed as below:    melatonin   3 milliGRAM(s) Oral (11-14-23 @ 20:20)    ondansetron Injectable   4 milliGRAM(s) IV Push (11-15-23 @ 08:30)    oxycodone    5 mG/acetaminophen 325 mG   1 Tablet(s) Oral (11-15-23 @ 13:25)   1 Tablet(s) Oral (11-15-23 @ 09:17)   1 Tablet(s) Oral (11-15-23 @ 05:17)   1 Tablet(s) Oral (11-15-23 @ 01:04)   1 Tablet(s) Oral (11-14-23 @ 20:21)   1 Tablet(s) Oral (11-14-23 @ 15:45)          T(C): 36.7 (11-15-23 @ 08:35), Max: 36.7 (11-15-23 @ 00:03)  HR: 43 (11-15-23 @ 08:35) (43 - 60)  BP: 104/64 (11-15-23 @ 08:35) (102/58 - 112/68)  RR: 17 (11-15-23 @ 08:35) (12 - 18)  SpO2: 97% (11-15-23 @ 08:35) (95% - 100%)      11-14-23 @ 07:01  -  11-15-23 @ 07:00  --------------------------------------------------------  IN: 580 mL / OUT: 0 mL / NET: 580 mL        acetaminophen     Tablet .. 650 milliGRAM(s) Oral every 6 hours PRN  aluminum hydroxide/magnesium hydroxide/simethicone Suspension 30 milliLiter(s) Oral every 4 hours PRN  gabapentin 300 milliGRAM(s) Oral three times a day  heparin   Injectable 5000 Unit(s) IV Push every 6 hours PRN  heparin   Injectable 72404 Unit(s) IV Push every 6 hours PRN  heparin  Infusion. 2000 Unit(s)/Hr IV Continuous <Continuous>  influenza   Vaccine 0.5 milliLiter(s) IntraMuscular once  melatonin 3 milliGRAM(s) Oral at bedtime PRN  ondansetron Injectable 4 milliGRAM(s) IV Push every 8 hours PRN  oxycodone    5 mG/acetaminophen 325 mG 1 Tablet(s) Oral every 4 hours PRN  polyethylene glycol 3350 17 Gram(s) Oral daily  senna 2 Tablet(s) Oral at bedtime  sodium chloride 0.9% lock flush 3 milliLiter(s) IV Push every 8 hours                          12.6   8.10  )-----------( 234      ( 15 Nov 2023 06:48 )             38.4     11-14    138  |  104  |  14.4  ----------------------------<  105<H>  4.2   |  24.0  |  0.60    Ca    8.3<L>      14 Nov 2023 03:45  Phos  2.8     11-14  Mg     1.9     11-14    TPro  6.0<L>  /  Alb  3.4  /  TBili  0.2<L>  /  DBili  x   /  AST  15  /  ALT  27  /  AlkPhos  51  11-14    PT/INR - ( 14 Nov 2023 03:45 )   PT: 11.6 sec;   INR: 1.05 ratio         PTT - ( 15 Nov 2023 06:48 )  PTT:120.2 sec  Urinalysis Basic - ( 14 Nov 2023 03:45 )    Color: x / Appearance: x / SG: x / pH: x  Gluc: 105 mg/dL / Ketone: x  / Bili: x / Urobili: x   Blood: x / Protein: x / Nitrite: x   Leuk Esterase: x / RBC: x / WBC x   Sq Epi: x / Non Sq Epi: x / Bacteria: x        Pain Service   [phone]

## 2023-11-16 ENCOUNTER — TRANSCRIPTION ENCOUNTER (OUTPATIENT)
Age: 28
End: 2023-11-16

## 2023-11-16 VITALS
OXYGEN SATURATION: 98 % | TEMPERATURE: 98 F | RESPIRATION RATE: 18 BRPM | SYSTOLIC BLOOD PRESSURE: 136 MMHG | HEART RATE: 57 BPM | DIASTOLIC BLOOD PRESSURE: 85 MMHG

## 2023-11-16 LAB
ALBUMIN SERPL ELPH-MCNC: 3.4 G/DL — SIGNIFICANT CHANGE UP (ref 3.3–5.2)
ALBUMIN SERPL ELPH-MCNC: 3.4 G/DL — SIGNIFICANT CHANGE UP (ref 3.3–5.2)
ALP SERPL-CCNC: 49 U/L — SIGNIFICANT CHANGE UP (ref 40–120)
ALP SERPL-CCNC: 49 U/L — SIGNIFICANT CHANGE UP (ref 40–120)
ALT FLD-CCNC: 74 U/L — HIGH
ALT FLD-CCNC: 74 U/L — HIGH
ANION GAP SERPL CALC-SCNC: 13 MMOL/L — SIGNIFICANT CHANGE UP (ref 5–17)
ANION GAP SERPL CALC-SCNC: 13 MMOL/L — SIGNIFICANT CHANGE UP (ref 5–17)
AST SERPL-CCNC: 56 U/L — HIGH
AST SERPL-CCNC: 56 U/L — HIGH
B2 GLYCOPROT1 IGA SER QL: <5 SAU — SIGNIFICANT CHANGE UP
B2 GLYCOPROT1 IGA SER QL: <5 SAU — SIGNIFICANT CHANGE UP
BILIRUB SERPL-MCNC: <0.2 MG/DL — LOW (ref 0.4–2)
BILIRUB SERPL-MCNC: <0.2 MG/DL — LOW (ref 0.4–2)
BUN SERPL-MCNC: 15.4 MG/DL — SIGNIFICANT CHANGE UP (ref 8–20)
BUN SERPL-MCNC: 15.4 MG/DL — SIGNIFICANT CHANGE UP (ref 8–20)
CALCIUM SERPL-MCNC: 8.6 MG/DL — SIGNIFICANT CHANGE UP (ref 8.4–10.5)
CALCIUM SERPL-MCNC: 8.6 MG/DL — SIGNIFICANT CHANGE UP (ref 8.4–10.5)
CARDIOLIPIN AB SER-ACNC: NEGATIVE — SIGNIFICANT CHANGE UP
CARDIOLIPIN AB SER-ACNC: NEGATIVE — SIGNIFICANT CHANGE UP
CHLORIDE SERPL-SCNC: 102 MMOL/L — SIGNIFICANT CHANGE UP (ref 96–108)
CHLORIDE SERPL-SCNC: 102 MMOL/L — SIGNIFICANT CHANGE UP (ref 96–108)
CO2 SERPL-SCNC: 25 MMOL/L — SIGNIFICANT CHANGE UP (ref 22–29)
CO2 SERPL-SCNC: 25 MMOL/L — SIGNIFICANT CHANGE UP (ref 22–29)
CREAT SERPL-MCNC: 0.7 MG/DL — SIGNIFICANT CHANGE UP (ref 0.5–1.3)
CREAT SERPL-MCNC: 0.7 MG/DL — SIGNIFICANT CHANGE UP (ref 0.5–1.3)
EGFR: 122 ML/MIN/1.73M2 — SIGNIFICANT CHANGE UP
EGFR: 122 ML/MIN/1.73M2 — SIGNIFICANT CHANGE UP
GLUCOSE SERPL-MCNC: 82 MG/DL — SIGNIFICANT CHANGE UP (ref 70–99)
GLUCOSE SERPL-MCNC: 82 MG/DL — SIGNIFICANT CHANGE UP (ref 70–99)
HCT VFR BLD CALC: 40.3 % — SIGNIFICANT CHANGE UP (ref 34.5–45)
HCT VFR BLD CALC: 40.3 % — SIGNIFICANT CHANGE UP (ref 34.5–45)
HGB BLD-MCNC: 13.1 G/DL — SIGNIFICANT CHANGE UP (ref 11.5–15.5)
HGB BLD-MCNC: 13.1 G/DL — SIGNIFICANT CHANGE UP (ref 11.5–15.5)
MAGNESIUM SERPL-MCNC: 1.9 MG/DL — SIGNIFICANT CHANGE UP (ref 1.6–2.6)
MAGNESIUM SERPL-MCNC: 1.9 MG/DL — SIGNIFICANT CHANGE UP (ref 1.6–2.6)
MCHC RBC-ENTMCNC: 28.1 PG — SIGNIFICANT CHANGE UP (ref 27–34)
MCHC RBC-ENTMCNC: 28.1 PG — SIGNIFICANT CHANGE UP (ref 27–34)
MCHC RBC-ENTMCNC: 32.5 GM/DL — SIGNIFICANT CHANGE UP (ref 32–36)
MCHC RBC-ENTMCNC: 32.5 GM/DL — SIGNIFICANT CHANGE UP (ref 32–36)
MCV RBC AUTO: 86.5 FL — SIGNIFICANT CHANGE UP (ref 80–100)
MCV RBC AUTO: 86.5 FL — SIGNIFICANT CHANGE UP (ref 80–100)
PHOSPHATE SERPL-MCNC: 4.5 MG/DL — SIGNIFICANT CHANGE UP (ref 2.4–4.7)
PHOSPHATE SERPL-MCNC: 4.5 MG/DL — SIGNIFICANT CHANGE UP (ref 2.4–4.7)
PLATELET # BLD AUTO: 270 K/UL — SIGNIFICANT CHANGE UP (ref 150–400)
PLATELET # BLD AUTO: 270 K/UL — SIGNIFICANT CHANGE UP (ref 150–400)
POTASSIUM SERPL-MCNC: 4.4 MMOL/L — SIGNIFICANT CHANGE UP (ref 3.5–5.3)
POTASSIUM SERPL-MCNC: 4.4 MMOL/L — SIGNIFICANT CHANGE UP (ref 3.5–5.3)
POTASSIUM SERPL-SCNC: 4.4 MMOL/L — SIGNIFICANT CHANGE UP (ref 3.5–5.3)
POTASSIUM SERPL-SCNC: 4.4 MMOL/L — SIGNIFICANT CHANGE UP (ref 3.5–5.3)
PROT S FREE PPP-ACNC: 85 % — SIGNIFICANT CHANGE UP (ref 63–140)
PROT S FREE PPP-ACNC: 85 % — SIGNIFICANT CHANGE UP (ref 63–140)
PROT SERPL-MCNC: 6.2 G/DL — LOW (ref 6.6–8.7)
PROT SERPL-MCNC: 6.2 G/DL — LOW (ref 6.6–8.7)
RBC # BLD: 4.66 M/UL — SIGNIFICANT CHANGE UP (ref 3.8–5.2)
RBC # BLD: 4.66 M/UL — SIGNIFICANT CHANGE UP (ref 3.8–5.2)
RBC # FLD: 13.5 % — SIGNIFICANT CHANGE UP (ref 10.3–14.5)
RBC # FLD: 13.5 % — SIGNIFICANT CHANGE UP (ref 10.3–14.5)
SODIUM SERPL-SCNC: 139 MMOL/L — SIGNIFICANT CHANGE UP (ref 135–145)
SODIUM SERPL-SCNC: 139 MMOL/L — SIGNIFICANT CHANGE UP (ref 135–145)
WBC # BLD: 8.05 K/UL — SIGNIFICANT CHANGE UP (ref 3.8–10.5)
WBC # BLD: 8.05 K/UL — SIGNIFICANT CHANGE UP (ref 3.8–10.5)
WBC # FLD AUTO: 8.05 K/UL — SIGNIFICANT CHANGE UP (ref 3.8–10.5)
WBC # FLD AUTO: 8.05 K/UL — SIGNIFICANT CHANGE UP (ref 3.8–10.5)

## 2023-11-16 PROCEDURE — 96375 TX/PRO/DX INJ NEW DRUG ADDON: CPT

## 2023-11-16 PROCEDURE — 70491 CT SOFT TISSUE NECK W/DYE: CPT | Mod: MA

## 2023-11-16 PROCEDURE — 85300 ANTITHROMBIN III ACTIVITY: CPT

## 2023-11-16 PROCEDURE — 85730 THROMBOPLASTIN TIME PARTIAL: CPT

## 2023-11-16 PROCEDURE — 84702 CHORIONIC GONADOTROPIN TEST: CPT

## 2023-11-16 PROCEDURE — 81241 F5 GENE: CPT

## 2023-11-16 PROCEDURE — 85027 COMPLETE CBC AUTOMATED: CPT

## 2023-11-16 PROCEDURE — 83735 ASSAY OF MAGNESIUM: CPT

## 2023-11-16 PROCEDURE — 86146 BETA-2 GLYCOPROTEIN ANTIBODY: CPT

## 2023-11-16 PROCEDURE — 86900 BLOOD TYPING SEROLOGIC ABO: CPT

## 2023-11-16 PROCEDURE — 85306 CLOT INHIBIT PROT S FREE: CPT

## 2023-11-16 PROCEDURE — 70545 MR ANGIOGRAPHY HEAD W/DYE: CPT | Mod: MA

## 2023-11-16 PROCEDURE — 86147 CARDIOLIPIN ANTIBODY EA IG: CPT

## 2023-11-16 PROCEDURE — 36415 COLL VENOUS BLD VENIPUNCTURE: CPT

## 2023-11-16 PROCEDURE — 85303 CLOT INHIBIT PROT C ACTIVITY: CPT

## 2023-11-16 PROCEDURE — 85025 COMPLETE CBC W/AUTO DIFF WBC: CPT

## 2023-11-16 PROCEDURE — 83090 ASSAY OF HOMOCYSTEINE: CPT

## 2023-11-16 PROCEDURE — 86038 ANTINUCLEAR ANTIBODIES: CPT

## 2023-11-16 PROCEDURE — 99239 HOSP IP/OBS DSCHRG MGMT >30: CPT | Mod: GC

## 2023-11-16 PROCEDURE — 81240 F2 GENE: CPT

## 2023-11-16 PROCEDURE — 70450 CT HEAD/BRAIN W/O DYE: CPT | Mod: MA

## 2023-11-16 PROCEDURE — 86901 BLOOD TYPING SEROLOGIC RH(D): CPT

## 2023-11-16 PROCEDURE — 70553 MRI BRAIN STEM W/O & W/DYE: CPT | Mod: MA

## 2023-11-16 PROCEDURE — 96374 THER/PROPH/DIAG INJ IV PUSH: CPT

## 2023-11-16 PROCEDURE — 85307 ASSAY ACTIVATED PROTEIN C: CPT

## 2023-11-16 PROCEDURE — 85610 PROTHROMBIN TIME: CPT

## 2023-11-16 PROCEDURE — 85220 BLOOC CLOT FACTOR V TEST: CPT

## 2023-11-16 PROCEDURE — 85613 RUSSELL VIPER VENOM DILUTED: CPT

## 2023-11-16 PROCEDURE — 84100 ASSAY OF PHOSPHORUS: CPT

## 2023-11-16 PROCEDURE — 85301 ANTITHROMBIN III ANTIGEN: CPT

## 2023-11-16 PROCEDURE — 86850 RBC ANTIBODY SCREEN: CPT

## 2023-11-16 PROCEDURE — 99285 EMERGENCY DEPT VISIT HI MDM: CPT

## 2023-11-16 PROCEDURE — 80053 COMPREHEN METABOLIC PANEL: CPT

## 2023-11-16 RX ORDER — SENNA PLUS 8.6 MG/1
2 TABLET ORAL
Qty: 60 | Refills: 0
Start: 2023-11-16 | End: 2023-12-15

## 2023-11-16 RX ORDER — OXYCODONE AND ACETAMINOPHEN 5; 325 MG/1; MG/1
1 TABLET ORAL
Qty: 42 | Refills: 0
Start: 2023-11-16 | End: 2023-11-22

## 2023-11-16 RX ORDER — POLYETHYLENE GLYCOL 3350 17 G/17G
17 POWDER, FOR SOLUTION ORAL
Qty: 510 | Refills: 0
Start: 2023-11-16 | End: 2023-12-15

## 2023-11-16 RX ORDER — LANOLIN ALCOHOL/MO/W.PET/CERES
1 CREAM (GRAM) TOPICAL
Qty: 10 | Refills: 0
Start: 2023-11-16 | End: 2023-11-25

## 2023-11-16 RX ORDER — APIXABAN 2.5 MG/1
2 TABLET, FILM COATED ORAL
Qty: 120 | Refills: 0
Start: 2023-11-16 | End: 2023-12-15

## 2023-11-16 RX ORDER — DIPHENHYDRAMINE HCL 50 MG
1 CAPSULE ORAL
Qty: 5 | Refills: 0
Start: 2023-11-16 | End: 2023-11-20

## 2023-11-16 RX ORDER — ACETAMINOPHEN 500 MG
1000 TABLET ORAL ONCE
Refills: 0 | Status: COMPLETED | OUTPATIENT
Start: 2023-11-16 | End: 2023-11-16

## 2023-11-16 RX ORDER — GABAPENTIN 400 MG/1
1 CAPSULE ORAL
Qty: 60 | Refills: 0
Start: 2023-11-16 | End: 2023-12-05

## 2023-11-16 RX ADMIN — GABAPENTIN 300 MILLIGRAM(S): 400 CAPSULE ORAL at 05:10

## 2023-11-16 RX ADMIN — SODIUM CHLORIDE 3 MILLILITER(S): 9 INJECTION INTRAMUSCULAR; INTRAVENOUS; SUBCUTANEOUS at 05:07

## 2023-11-16 RX ADMIN — APIXABAN 10 MILLIGRAM(S): 2.5 TABLET, FILM COATED ORAL at 05:10

## 2023-11-16 RX ADMIN — Medication 25 MILLIGRAM(S): at 12:07

## 2023-11-16 RX ADMIN — ONDANSETRON 4 MILLIGRAM(S): 8 TABLET, FILM COATED ORAL at 08:33

## 2023-11-16 RX ADMIN — Medication 1000 MILLIGRAM(S): at 10:20

## 2023-11-16 RX ADMIN — POLYETHYLENE GLYCOL 3350 17 GRAM(S): 17 POWDER, FOR SOLUTION ORAL at 12:09

## 2023-11-16 RX ADMIN — Medication 400 MILLIGRAM(S): at 10:07

## 2023-11-16 RX ADMIN — GABAPENTIN 300 MILLIGRAM(S): 400 CAPSULE ORAL at 13:10

## 2023-11-16 NOTE — CHART NOTE - NSCHARTNOTEFT_GEN_A_CORE
Patient has been hospitalized at [facility] from  11/13/2023 to today. She can stay away from work for 1 week until her symptoms resolve or improve and is able to work. She can then follow with PMD and clear for work.  Please extend your consideration to the patient and family.  Feel free to contact me with any questions.

## 2023-11-16 NOTE — DISCHARGE NOTE PROVIDER - PROVIDER TOKENS
PROVIDER:[TOKEN:[[id number]]],PROVIDER:[TOKEN:[[id number]]] PROVIDER:[TOKEN:[[id number]]],PROVIDER:[TOKEN:[[id number]]],PROVIDER:[TOKEN:[[id number]]]

## 2023-11-16 NOTE — DISCHARGE NOTE PROVIDER - HOSPITAL COURSE
29 y/o female with hx of Asthma, Obesity who presented to ED c/o progressive global HA, fullness sensation in ears and neck for the past 2 weeks. Denies focal weakness, visual changes, fever, travel, or sick contacts. She was diagnosed with enterovirus 10/18 after presenting to ED with URI symptoms. Has been taking motrin/tylenol with little relief. Due to worsening of her symptoms she came to ED and was found to have venous sinus thrombosis on CT head/neck which was confirmed by MRV. She was started on Heparin gtt and seen by Neurosurgery. She denies any hx of VTE or rheumatological hx. She was neg for COVID on RVP on 10/18. Denies ever using OCP, denies smoking cigarettes but does admit to Vaping. No other complaints at this time.     started i=on heparin drip while awaiting hypercoagulable w/u. once resulted switched to Eliquis.   for HA- gabapentin and percocet. avoid nsaids/ fioricet    Medically stable and agreeable with discharge and follow up plan. Patient was advised to return to ED if any symptoms occur or worsen.

## 2023-11-16 NOTE — DISCHARGE NOTE PROVIDER - ATTENDING DISCHARGE PHYSICAL EXAMINATION:
Vital Signs Last 24 Hrs  T(C): 36.3 (11-16-23 @ 09:34), Max: 36.8 (11-15-23 @ 12:30)  T(F): 97.4 (11-16-23 @ 09:34), Max: 98.3 (11-15-23 @ 12:30)  HR: 54 (11-16-23 @ 09:34) (50 - 68)  BP: 111/74 (11-16-23 @ 09:34) (106/63 - 134/71)  BP(mean): --  RR: 18 (11-16-23 @ 09:34) (17 - 18)  SpO2: 96% (11-16-23 @ 09:34) (93% - 99%)  General: Obese woman lying in bed with slight discomfort   HEENT: atraumatic, normocephalic  Pulmonary: clear to auscultation bilaterally; No wheeze  Cardiovascular: Regular rate and rhythm; no murmurs, rubs or gallops. Normal S1S2  Gastrointestinal: Soft, nontender, nondistended; bowel sounds present  Musculoskeletal: 2+ peripheral pulses, no clubbing, cyanosis or edema  Neurology: Pt. alert and oriented, fluent speech, able to move all extremities, diminished sensation to touch in CN V distribution on R. All other CN grossly intact. 5/5 muscle strength in UE/LE. - macy hallpike, - romberg.  Skin: no rashes or lesions

## 2023-11-16 NOTE — PROGRESS NOTE ADULT - SUBJECTIVE AND OBJECTIVE BOX
Heme/Onc Progress note    INTERVAL HPI/OVERNIGHT EVENTS:  Patient S&E at bedside. No o/n events, patient still c/o HA more intense on R side.    VITAL SIGNS:  T(F): 98.5 (11-16-23 @ 13:23)  HR: 57 (11-16-23 @ 13:23)  BP: 136/85 (11-16-23 @ 13:23)  RR: 18 (11-16-23 @ 13:23)  SpO2: 98% (11-16-23 @ 13:23)  Wt(kg): --    PHYSICAL EXAM:    Constitutional: c/o HA  Eyes: EOMI, sclera non-icteric  Neck: supple, no masses, no JVD  Respiratory: diminished b/l   Cardiovascular: RRR, no M/R/G  Gastrointestinal: soft, NTND, no masses palpable, + BS,  Extremities: no c/c/e  Neurological: AAOx3      MEDICATIONS  (STANDING):  apixaban 10 milliGRAM(s) Oral every 12 hours  gabapentin 300 milliGRAM(s) Oral three times a day  influenza   Vaccine 0.5 milliLiter(s) IntraMuscular once  polyethylene glycol 3350 17 Gram(s) Oral daily  senna 2 Tablet(s) Oral at bedtime  sodium chloride 0.9% lock flush 3 milliLiter(s) IV Push every 8 hours    MEDICATIONS  (PRN):  acetaminophen     Tablet .. 650 milliGRAM(s) Oral every 6 hours PRN Temp greater or equal to 38C (100.4F), Mild Pain (1 - 3)  aluminum hydroxide/magnesium hydroxide/simethicone Suspension 30 milliLiter(s) Oral every 4 hours PRN Dyspepsia  diphenhydrAMINE 25 milliGRAM(s) Oral two times a day PRN Rash and/or Itching  melatonin 3 milliGRAM(s) Oral at bedtime PRN Insomnia  ondansetron Injectable 4 milliGRAM(s) IV Push every 8 hours PRN Nausea and/or Vomiting  oxycodone    5 mG/acetaminophen 325 mG 1 Tablet(s) Oral every 4 hours PRN Moderate to sever  headach and pain      Allergies    No Known Allergies    Intolerances        LABS:                        13.1   8.05  )-----------( 270      ( 16 Nov 2023 06:18 )             40.3     11-16    139  |  102  |  15.4  ----------------------------<  82  4.4   |  25.0  |  0.70    Ca    8.6      16 Nov 2023 06:18  Phos  4.5     11-16  Mg     1.9     11-16    TPro  6.2<L>  /  Alb  3.4  /  TBili  <0.2<L>  /  DBili  x   /  AST  56<H>  /  ALT  74<H>  /  AlkPhos  49  11-16    PTT - ( 15 Nov 2023 22:05 )  PTT:36.2 sec  Urinalysis Basic - ( 16 Nov 2023 06:18 )    Color: x / Appearance: x / SG: x / pH: x  Gluc: 82 mg/dL / Ketone: x  / Bili: x / Urobili: x   Blood: x / Protein: x / Nitrite: x   Leuk Esterase: x / RBC: x / WBC x   Sq Epi: x / Non Sq Epi: x / Bacteria: x    Antithrombin III Antigen (11.14.23 @ 08:29)   Antithrombin III Antigen: 22 mg/dL  Anticardiolipin Antibody Level, Total (11.14.23 @ 08:29)   Anticardiolipin Antibody Level, Total: Negative: Method: EIA  Beta 2 Glycoprotein 1 Antibody Screen (11.14.23 @ 08:29)   Beta 2 Glycoprotein 1 Antibody Screen: Negative  Dilute Rogelio's Viper Venom Time (11.14.23 @ 08:29)   DRVVT Ratio: 1.04 Ratio  DRVVT Interpretation: LA NEG:   Protein C Functional Assay with Reflex (11.14.23 @ 08:29)   Protein C Functional Assay with Reflex: 119:   Antithrombin III Assay with Reflex (11.14.23 @ 08:29)   Antithrombin III Assay with Reflex: 79: Anti-Nuclear Antibody in AM (11.14.23 @ 07:05)   Anti Nuclear Factor Titer: Negative:         RADIOLOGY & ADDITIONAL TESTS:  Studies reviewed.

## 2023-11-16 NOTE — DISCHARGE NOTE PROVIDER - CARE PROVIDER_API CALL
Cheri Carter)  Hematology  [address] [place] [address]  Phone: [phone]  Fax: [phone]  Follow Up Time:     Leyla Irby  Pain Medicine  [address] [place] [address], [place] [address]  Phone: [phone]  Fax: [phone]  Follow Up Time:    Cheri Carter)  Hematology  [address] [place] [address]  Phone: [phone]  Fax: [phone]  Follow Up Time:     Leyla Irby  Pain Medicine  [address] [facility], [place] [address]  Phone: [phone]  Fax: [phone]  Follow Up Time:     Kiersten Stoll NP in Adult Health  [address] [place] [address]  Phone: [phone]  Fax: [phone]  Follow Up Time:

## 2023-11-16 NOTE — DISCHARGE NOTE PROVIDER - NSDCMRMEDTOKEN_GEN_ALL_CORE_FT
apixaban 5 mg oral tablet: 2 tab(s) orally every 12 hours started on 11/15 at 6 PM  compelte loading dose of 2 tabs 2 times a Day for 14 doses and then take 1 tab 2 times a day  gabapentin 300 mg oral capsule: 1 cap(s) orally 3 times a day  polyethylene glycol 3350 oral powder for reconstitution: 17 gram(s) orally once a day  senna leaf extract oral tablet: 2 tab(s) orally once a day (at bedtime)   apixaban 5 mg oral tablet: 2 tab(s) orally every 12 hours started on 11/15 at 6 PM  compelte loading dose of 2 tabs 2 times a Day for 14 doses and then take 1 tab 2 times a day  diphenhydrAMINE 25 mg oral capsule: 1 cap(s) orally once a day (at bedtime) as needed for ear congestion  gabapentin 300 mg oral capsule: 1 cap(s) orally 3 times a day  melatonin 5 mg oral tablet: 1 tab(s) orally once a day (at bedtime)  oxycodone-acetaminophen 5 mg-325 mg oral tablet: 1 tab(s) orally every 4 hours as needed for Moderate to sever  headach and pain MDD: 6  polyethylene glycol 3350 oral powder for reconstitution: 17 gram(s) orally once a day  senna leaf extract oral tablet: 2 tab(s) orally once a day (at bedtime)

## 2023-11-16 NOTE — DISCHARGE NOTE PROVIDER - NSDCCPCAREPLAN_GEN_ALL_CORE_FT
PRINCIPAL DISCHARGE DIAGNOSIS  Diagnosis: Cerebral venous sinus thrombosis  Assessment and Plan of Treatment:       SECONDARY DISCHARGE DIAGNOSES  Diagnosis: Rhinovirus infection  Assessment and Plan of Treatment:     Diagnosis: Morbid obesity  Assessment and Plan of Treatment:

## 2023-11-16 NOTE — DISCHARGE NOTE NURSING/CASE MANAGEMENT/SOCIAL WORK - PATIENT PORTAL LINK FT
You can access the FollowMyHealth Patient Portal offered by [facility] by registering at the following website: [email or web address]. By joining [employer]’s FollowMyHealth portal, you will also be able to view your health information using other applications (apps) compatible with our system.
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
## 2023-11-17 ENCOUNTER — OUTPATIENT (OUTPATIENT)
Dept: OUTPATIENT SERVICES | Facility: HOSPITAL | Age: 28
LOS: 1 days | Discharge: HOME | End: 2023-11-17

## 2023-11-17 DIAGNOSIS — I67.6 NONPYOGENIC THROMBOSIS OF INTRACRANIAL VENOUS SYSTEM: ICD-10-CM

## 2023-11-17 DIAGNOSIS — Z98.890 OTHER SPECIFIED POSTPROCEDURAL STATES: Chronic | ICD-10-CM

## 2023-11-17 LAB
APCR PPP: 1.68 RATIO — LOW
APCR PPP: 1.68 RATIO — LOW
PROT S FREE PPP-ACNC: 81 % — SIGNIFICANT CHANGE UP (ref 63–140)
PROT S FREE PPP-ACNC: 81 % — SIGNIFICANT CHANGE UP (ref 63–140)

## 2023-11-20 ENCOUNTER — RESULT REVIEW (OUTPATIENT)
Age: 28
End: 2023-11-20

## 2023-11-20 ENCOUNTER — APPOINTMENT (OUTPATIENT)
Dept: HEMATOLOGY ONCOLOGY | Facility: CLINIC | Age: 28
End: 2023-11-20
Payer: MEDICAID

## 2023-11-20 VITALS
DIASTOLIC BLOOD PRESSURE: 81 MMHG | BODY MASS INDEX: 46.53 KG/M2 | HEIGHT: 66.5 IN | OXYGEN SATURATION: 99 % | WEIGHT: 293 LBS | SYSTOLIC BLOOD PRESSURE: 118 MMHG | HEART RATE: 61 BPM

## 2023-11-20 LAB
BASOPHILS # BLD AUTO: 0.1 K/UL — SIGNIFICANT CHANGE UP (ref 0–0.2)
BASOPHILS # BLD AUTO: 0.1 K/UL — SIGNIFICANT CHANGE UP (ref 0–0.2)
BASOPHILS NFR BLD AUTO: 0.8 % — SIGNIFICANT CHANGE UP (ref 0–2)
BASOPHILS NFR BLD AUTO: 0.8 % — SIGNIFICANT CHANGE UP (ref 0–2)
DNA PLOIDY SPEC FC-IMP: SIGNIFICANT CHANGE UP
DNA PLOIDY SPEC FC-IMP: SIGNIFICANT CHANGE UP
EOSINOPHIL # BLD AUTO: 0.7 K/UL — HIGH (ref 0–0.5)
EOSINOPHIL # BLD AUTO: 0.7 K/UL — HIGH (ref 0–0.5)
EOSINOPHIL NFR BLD AUTO: 7.2 % — HIGH (ref 0–6)
EOSINOPHIL NFR BLD AUTO: 7.2 % — HIGH (ref 0–6)
HCT VFR BLD CALC: 47.2 % — HIGH (ref 34.5–45)
HCT VFR BLD CALC: 47.2 % — HIGH (ref 34.5–45)
HGB BLD-MCNC: 15.2 G/DL — SIGNIFICANT CHANGE UP (ref 11.5–15.5)
HGB BLD-MCNC: 15.2 G/DL — SIGNIFICANT CHANGE UP (ref 11.5–15.5)
LYMPHOCYTES # BLD AUTO: 2.3 K/UL — SIGNIFICANT CHANGE UP (ref 1–3.3)
LYMPHOCYTES # BLD AUTO: 2.3 K/UL — SIGNIFICANT CHANGE UP (ref 1–3.3)
LYMPHOCYTES # BLD AUTO: 24.8 % — SIGNIFICANT CHANGE UP (ref 13–44)
LYMPHOCYTES # BLD AUTO: 24.8 % — SIGNIFICANT CHANGE UP (ref 13–44)
MCHC RBC-ENTMCNC: 28.7 PG — SIGNIFICANT CHANGE UP (ref 27–34)
MCHC RBC-ENTMCNC: 28.7 PG — SIGNIFICANT CHANGE UP (ref 27–34)
MCHC RBC-ENTMCNC: 32.2 G/DL — SIGNIFICANT CHANGE UP (ref 32–36)
MCHC RBC-ENTMCNC: 32.2 G/DL — SIGNIFICANT CHANGE UP (ref 32–36)
MCV RBC AUTO: 89.1 FL — SIGNIFICANT CHANGE UP (ref 80–100)
MCV RBC AUTO: 89.1 FL — SIGNIFICANT CHANGE UP (ref 80–100)
MONOCYTES # BLD AUTO: 0.8 K/UL — SIGNIFICANT CHANGE UP (ref 0–0.9)
MONOCYTES # BLD AUTO: 0.8 K/UL — SIGNIFICANT CHANGE UP (ref 0–0.9)
MONOCYTES NFR BLD AUTO: 8.4 % — SIGNIFICANT CHANGE UP (ref 2–14)
MONOCYTES NFR BLD AUTO: 8.4 % — SIGNIFICANT CHANGE UP (ref 2–14)
NEUTROPHILS # BLD AUTO: 5.4 K/UL — SIGNIFICANT CHANGE UP (ref 1.8–7.4)
NEUTROPHILS # BLD AUTO: 5.4 K/UL — SIGNIFICANT CHANGE UP (ref 1.8–7.4)
NEUTROPHILS NFR BLD AUTO: 58.8 % — SIGNIFICANT CHANGE UP (ref 43–77)
NEUTROPHILS NFR BLD AUTO: 58.8 % — SIGNIFICANT CHANGE UP (ref 43–77)
PLATELET # BLD AUTO: 267 K/UL — SIGNIFICANT CHANGE UP (ref 150–400)
PLATELET # BLD AUTO: 267 K/UL — SIGNIFICANT CHANGE UP (ref 150–400)
PTR INTERPRETATION: SIGNIFICANT CHANGE UP
PTR INTERPRETATION: SIGNIFICANT CHANGE UP
RBC # BLD: 5.3 M/UL — HIGH (ref 3.8–5.2)
RBC # BLD: 5.3 M/UL — HIGH (ref 3.8–5.2)
RBC # FLD: 12.8 % — SIGNIFICANT CHANGE UP (ref 10.3–14.5)
RBC # FLD: 12.8 % — SIGNIFICANT CHANGE UP (ref 10.3–14.5)
WBC # BLD: 9.1 K/UL — SIGNIFICANT CHANGE UP (ref 3.8–10.5)
WBC # BLD: 9.1 K/UL — SIGNIFICANT CHANGE UP (ref 3.8–10.5)
WBC # FLD AUTO: 9.1 K/UL — SIGNIFICANT CHANGE UP (ref 3.8–10.5)
WBC # FLD AUTO: 9.1 K/UL — SIGNIFICANT CHANGE UP (ref 3.8–10.5)

## 2023-11-20 PROCEDURE — G2212 PROLONG OUTPT/OFFICE VIS: CPT

## 2023-11-20 PROCEDURE — 99205 OFFICE O/P NEW HI 60 MIN: CPT

## 2023-11-20 RX ORDER — APIXABAN 5 MG/1
5 TABLET, FILM COATED ORAL
Qty: 60 | Refills: 6 | Status: ACTIVE | COMMUNITY
Start: 2023-11-20 | End: 1900-01-01

## 2024-03-20 ENCOUNTER — OUTPATIENT (OUTPATIENT)
Dept: OUTPATIENT SERVICES | Facility: HOSPITAL | Age: 29
LOS: 1 days | Discharge: HOME | End: 2024-03-20

## 2024-03-20 DIAGNOSIS — I67.6 NONPYOGENIC THROMBOSIS OF INTRACRANIAL VENOUS SYSTEM: ICD-10-CM

## 2024-03-20 DIAGNOSIS — Z98.890 OTHER SPECIFIED POSTPROCEDURAL STATES: Chronic | ICD-10-CM

## 2024-04-02 ENCOUNTER — APPOINTMENT (OUTPATIENT)
Dept: HEMATOLOGY ONCOLOGY | Facility: CLINIC | Age: 29
End: 2024-04-02

## 2024-04-06 ENCOUNTER — EMERGENCY (EMERGENCY)
Facility: HOSPITAL | Age: 29
LOS: 1 days | Discharge: HOME | End: 2024-04-06
Attending: EMERGENCY MEDICINE
Payer: MEDICAID

## 2024-04-06 VITALS
DIASTOLIC BLOOD PRESSURE: 81 MMHG | TEMPERATURE: 98 F | WEIGHT: 293 LBS | HEART RATE: 65 BPM | SYSTOLIC BLOOD PRESSURE: 134 MMHG | OXYGEN SATURATION: 97 % | HEIGHT: 68 IN | RESPIRATION RATE: 18 BRPM

## 2024-04-06 DIAGNOSIS — Z98.890 OTHER SPECIFIED POSTPROCEDURAL STATES: Chronic | ICD-10-CM

## 2024-04-06 LAB
RAPID RVP RESULT: SIGNIFICANT CHANGE UP
S PYO DNA THROAT QL NAA+PROBE: DETECTED
SARS-COV-2 RNA SPEC QL NAA+PROBE: SIGNIFICANT CHANGE UP

## 2024-04-06 PROCEDURE — 99283 EMERGENCY DEPT VISIT LOW MDM: CPT

## 2024-04-06 PROCEDURE — 96372 THER/PROPH/DIAG INJ SC/IM: CPT

## 2024-04-06 PROCEDURE — 0225U NFCT DS DNA&RNA 21 SARSCOV2: CPT

## 2024-04-06 PROCEDURE — 99284 EMERGENCY DEPT VISIT MOD MDM: CPT

## 2024-04-06 PROCEDURE — 87798 DETECT AGENT NOS DNA AMP: CPT

## 2024-04-06 PROCEDURE — 87651 STREP A DNA AMP PROBE: CPT

## 2024-04-06 RX ORDER — DEXAMETHASONE 0.5 MG/5ML
6 ELIXIR ORAL ONCE
Refills: 0 | Status: COMPLETED | OUTPATIENT
Start: 2024-04-06 | End: 2024-04-06

## 2024-04-06 RX ORDER — DIAZEPAM 5 MG
5 TABLET ORAL ONCE
Refills: 0 | Status: DISCONTINUED | OUTPATIENT
Start: 2024-04-06 | End: 2024-04-06

## 2024-04-06 RX ADMIN — Medication 6 MILLIGRAM(S): at 13:07

## 2024-04-06 NOTE — ED PROVIDER NOTE - ATTENDING APP SHARED VISIT CONTRIBUTION OF CARE
I, Florencio Quiñonez MD, performed the initial face to face bedside interview with this patient regarding history of present illness, review of symptoms and relevant past medical, social and family history.  I completed an independent physical examination.  I was the initial provider who evaluated this patient. I have signed out the follow up of any pending tests (i.e. labs, radiological studies) to the ACP.  I have communicated the patient’s plan of care and disposition with the ACP.

## 2024-04-06 NOTE — ED PROVIDER NOTE - PHYSICAL EXAMINATION
Constitutional: Awake, alert and oriented. In no acute distress. Well appearing.  HEENT: NC/AT. Moist mucous membranes. No tonsillar enlargements/exudates. No stridor/drooling/trismus. No mastoid tenderness bilaterally.   Eyes: No scleral icterus. EOMI.  Neck:. Soft and supple. Full ROM without pain.  Cardiac: Regular rate and regular rhythm. +S1/S2. Peripheral pulses 2+ and symmetric. No LE edema.  Respiratory: Speaking in full sentences. No evidence of respiratory distress. No wheezes, rales or rhonchi.  Abdomen: Soft, non-distended and non tender Normal bowel sounds in all 4 quadrants. No guarding or rebound. no CVAT  Back: Spine midline and non-tender.   Skin: No rashes, abrasions or lacerations.  Lymph: No cervical lymphadenopathy.  Neuro: Awake, alert & oriented x 3. Moves all extremities symmetrically. Negative pronator drift, strength 5/5 all upper and lower extremities, sensation to light touch intact throughout upper/ lower extremities and face, finger to nose coordination intact, cranial nerves 2-12 intact  Psych: calm, cooperative, normal affect

## 2024-04-06 NOTE — ED PROVIDER NOTE - OBJECTIVE STATEMENT
29 y/o F with PMhx dural venous sinus thrombosis (on eliquis since Nov 2023) p/w c/o sore throat a/w subjective fever/chills  x 3-4 days. Denies any worsening headaches, changes in vision/diplopia, numbness, stridor/drooling/trismus, odynophagia, dysphagia, dental pain, cough, hemoptysis, sob, chest pain, dizziness, weakness, abdominal pain, back pain, n/v, urinary symptoms and with no other c/o. Denies LOC or trauma/injury.

## 2024-04-06 NOTE — ED PROVIDER NOTE - PATIENT PORTAL LINK FT
You can access the FollowMyHealth Patient Portal offered by [facility] by registering at the following website: [email or web address]. By joining [employer]’s FollowMyHealth portal, you will also be able to view your health information using other applications (apps) compatible with our system.

## 2024-04-06 NOTE — ED PROVIDER NOTE - CARE PROVIDER_API CALL
Florencio Cherry  Otolaryngology  [address] [place] [address], [address]  [place] [address]  Phone: [phone]  Fax: [phone]  Follow Up Time: 7-10 Days

## 2024-04-06 NOTE — ED PROVIDER NOTE - NSFOLLOWUPINSTRUCTIONS_ED_ALL_ED_FT
Please take all medications as prescribed  1)Follow up with your PCP in 1 week  2)Follow up with ENT clinic within 1 week  Return to the emergency room if you are experiencing any new or worsening symptoms Please take all medications as prescribed  1)Follow up with your PCP in 1 week  2)Follow up with ENT clinic within 1 week  Return to the emergency room if you are experiencing any new or worsening symptoms    Strep Throat  ImageStrep throat is a bacterial infection of the throat. Your health care provider may call the infection tonsillitis or pharyngitis, depending on whether there is swelling in the tonsils or at the back of the throat. Strep throat is most common during the cold months of the year in children who are 5–15 years of age, but it can happen during any season in people of any age. This infection is spread from person to person (contagious) through coughing, sneezing, or close contact.    What are the causes?  Strep throat is caused by the bacteria called Streptococcus pyogenes.    What increases the risk?  This condition is more likely to develop in:    People who spend time in crowded places where the infection can spread easily.  People who have close contact with someone who has strep throat.    What are the signs or symptoms?  Symptoms of this condition include:    Fever or chills.  Redness, swelling, or pain in the tonsils or throat.  Pain or difficulty when swallowing.  White or yellow spots on the tonsils or throat.  Swollen, tender glands in the neck or under the jaw.  Red rash all over the body (rare).    How is this diagnosed?  This condition is diagnosed by performing a rapid strep test or by taking a swab of your throat (throat culture test). Results from a rapid strep test are usually ready in a few minutes, but throat culture test results are available after one or two days.    How is this treated?  This condition is treated with antibiotic medicine.    Follow these instructions at home:  Medicines     Take over-the-counter and prescription medicines only as told by your health care provider.  Take your antibiotic as told by your health care provider. Do not stop taking the antibiotic even if you start to feel better.  Have family members who also have a sore throat or fever tested for strep throat. They may need antibiotics if they have the strep infection.  Eating and drinking     Do not share food, drinking cups, or personal items that could cause the infection to spread to other people.  If swallowing is difficult, try eating soft foods until your sore throat feels better.  Drink enough fluid to keep your urine clear or pale yellow.  General instructions     Gargle with a salt-water mixture 3–4 times per day or as needed. To make a salt-water mixture, completely dissolve ½–1 tsp of salt in 1 cup of warm water.  Make sure that all household members wash their hands well.  Get plenty of rest.  Stay home from school or work until you have been taking antibiotics for 24 hours.  Keep all follow-up visits as told by your health care provider. This is important.  Contact a health care provider if:  The glands in your neck continue to get bigger.  You develop a rash, cough, or earache.  You cough up a thick liquid that is green, yellow-brown, or bloody.  You have pain or discomfort that does not get better with medicine.  Your problems seem to be getting worse rather than better.  You have a fever.  Get help right away if:  You have new symptoms, such as vomiting, severe headache, stiff or painful neck, chest pain, or shortness of breath.  You have severe throat pain, drooling, or changes in your voice.  You have swelling of the neck, or the skin on the neck becomes red and tender.  You have signs of dehydration, such as fatigue, dry mouth, and decreased urination.  You become increasingly sleepy, or you cannot wake up completely.  Your joints become red or painful.  This information is not intended to replace advice given to you by your health care provider. Make sure you discuss any questions you have with your health care provider.

## 2024-04-06 NOTE — ED PROVIDER NOTE - CLINICAL SUMMARY MEDICAL DECISION MAKING FREE TEXT BOX
29 y/o F with PMhx dural venous sinus thrombosis (on eliquis since Nov 2023) p/w c/o sore throat a/w subjective fever/chills  x 3-4 days. Strep: pending. RSV/COVID: pending.  Instructed to f/u with ENT clinic within 2-3 days. Strict ED return precautions given if any new or worsening symptoms. 27 y/o F with PMhx dural venous sinus thrombosis (on eliquis since Nov 2023) p/w c/o sore throat a/w subjective fever/chills  x 3-4 days. Strep: positive. RSV/COVID: pending. Rx augmentin and Instructed to f/u with ENT clinic within 2-3 days. Strict ED return precautions given if any new or worsening symptoms.

## 2024-04-10 ENCOUNTER — EMERGENCY (EMERGENCY)
Facility: HOSPITAL | Age: 29
LOS: 1 days | Discharge: HOME | End: 2024-04-10
Attending: EMERGENCY MEDICINE
Payer: MEDICAID

## 2024-04-10 VITALS
DIASTOLIC BLOOD PRESSURE: 91 MMHG | SYSTOLIC BLOOD PRESSURE: 153 MMHG | TEMPERATURE: 98 F | WEIGHT: 293 LBS | HEART RATE: 53 BPM | HEIGHT: 67 IN | OXYGEN SATURATION: 97 % | RESPIRATION RATE: 20 BRPM

## 2024-04-10 VITALS
SYSTOLIC BLOOD PRESSURE: 137 MMHG | RESPIRATION RATE: 20 BRPM | HEART RATE: 61 BPM | OXYGEN SATURATION: 100 % | DIASTOLIC BLOOD PRESSURE: 86 MMHG | TEMPERATURE: 98 F

## 2024-04-10 DIAGNOSIS — Z98.890 OTHER SPECIFIED POSTPROCEDURAL STATES: Chronic | ICD-10-CM

## 2024-04-10 PROCEDURE — 73110 X-RAY EXAM OF WRIST: CPT | Mod: 26,RT

## 2024-04-10 PROCEDURE — 73110 X-RAY EXAM OF WRIST: CPT

## 2024-04-10 PROCEDURE — 99284 EMERGENCY DEPT VISIT MOD MDM: CPT

## 2024-04-10 PROCEDURE — 99283 EMERGENCY DEPT VISIT LOW MDM: CPT

## 2024-04-10 RX ORDER — ACETAMINOPHEN 500 MG
2 TABLET ORAL
Qty: 56 | Refills: 0
Start: 2024-04-10 | End: 2024-04-16

## 2024-04-10 RX ORDER — TRAMADOL HYDROCHLORIDE 50 MG/1
1 TABLET ORAL
Qty: 8 | Refills: 0
Start: 2024-04-10 | End: 2024-04-11

## 2024-04-10 RX ORDER — TRAMADOL HYDROCHLORIDE 50 MG/1
25 TABLET ORAL ONCE
Refills: 0 | Status: DISCONTINUED | OUTPATIENT
Start: 2024-04-10 | End: 2024-04-10

## 2024-04-10 RX ORDER — ACETAMINOPHEN 500 MG
975 TABLET ORAL ONCE
Refills: 0 | Status: COMPLETED | OUTPATIENT
Start: 2024-04-10 | End: 2024-04-10

## 2024-04-10 RX ADMIN — Medication 975 MILLIGRAM(S): at 11:53

## 2024-04-10 RX ADMIN — TRAMADOL HYDROCHLORIDE 25 MILLIGRAM(S): 50 TABLET ORAL at 11:53

## 2024-04-10 NOTE — ED PROVIDER NOTE - NSICDXPASTMEDICALHX_GEN_ALL_CORE_FT
PAST MEDICAL HISTORY:  Asthma as a child    Fracture of bone left 5th metatarsal    H/O cerebral venous sinus thrombosis     MRSA (methicillin resistant Staphylococcus aureus)     Obesity

## 2024-04-10 NOTE — ED PROVIDER NOTE - CARE PROVIDERS DIRECT ADDRESSES
[email or web address]

## 2024-04-10 NOTE — ED ADULT NURSE NOTE - NSFALLUNIVINTERV_ED_ALL_ED
Bed/Stretcher in lowest position, wheels locked, appropriate side rails in place/Call bell, personal items and telephone in reach/Instruct patient to call for assistance before getting out of bed/chair/stretcher/Non-slip footwear applied when patient is off stretcher/[place] to call system/Physically safe environment - no spills, clutter or unnecessary equipment/Purposeful proactive rounding/Room/bathroom lighting operational, light cord in reach

## 2024-04-10 NOTE — ED PROVIDER NOTE - ATTENDING CONTRIBUTION TO CARE
indep eval  pt had wrist pulled and then hitit on a wall  pain swelling decreased ROM  imaging no fx dislocation  impression is contusion/sprain  symptomatic care and hand fu  pain control  agree w cory and yosi

## 2024-04-10 NOTE — ED ADULT NURSE NOTE - OBJECTIVE STATEMENT
Pt comes in complaining of R wrist pain since earlier today. Pt states earlier today she was playing with her dog attempting to take a toy out of his mouth when the dog moved, causing her to bend her wrist backward. Denies fall or LOC.

## 2024-04-10 NOTE — ED PROVIDER NOTE - NSFOLLOWUPINSTRUCTIONS_ED_ALL_ED_FT
Sprain    A sprain is a stretch or tear in one of the tough, fiber-like tissues (ligaments) in your body. This is caused by an injury to the area such as a twisting mechanism. Symptoms include pain, swelling, or bruising. Rest that area over the next several days and slowly resume activity when tolerated. Ice can help with swelling and pain, alternate with warm compress.    SEEK IMMEDIATE MEDICAL CARE IF YOU HAVE ANY OF THE FOLLOWING SYMPTOMS: worsening pain, inability to move that body part, numbness or tingling.    Follow up with hand surgeon within 1 week.

## 2024-04-10 NOTE — ED PROVIDER NOTE - OBJECTIVE STATEMENT
27 yo woman presents to [facility] ED due to severe wrist pain after hitting it against the wall 2 days ago. Patient reports she was playing with her dog and hit her wrist against the wall, since then she had significant pain. She reports limited mobility in her wrist and says she can't move her fingers without feeling pain. She tried tylenol yesterday which provided minimal relief. She reports a history of wrist fracture when she was 14 yo and fell off a swing, requiring a cast but no surgery, she has had numerous wrist sprains since then.     ObGynHx: Never pregnant, not on contraception  PMHx: cerebral venous sinus thrombosis on Eliquis s/p head trauma 11/2023, following with Neurosurgeon Dr. Payton  PSHx: left ankle surgery  Meds: Eliquis 15mg QD+QHS, Tramadol prn for headache  All: nkda 29 yo woman presents to [facility] ED due to severe wrist pain after hitting it against the wall 2 days ago. Patient reports she was playing with her dog and hit her wrist against the wall, since then she had significant pain. She reports limited mobility in her wrist and says she can't move her fingers without feeling pain. She tried tylenol yesterday which provided minimal relief. She reports a history of wrist fracture when she was 16 yo and fell off a swing, requiring a cast but no surgery, she has had numerous wrist sprains since then.     ObGynHx: Never pregnant, not on contraception  PMHx: cerebral venous sinus thrombosis on Eliquis s/p head trauma 11/2023, following with Neurosurgeon Dr. Payton  PSHx: left foot surgery  Meds: Eliquis 15mg QD+QHS, Tramadol prn for headache  All: nkda

## 2024-04-10 NOTE — ED PROVIDER NOTE - PHYSICAL EXAMINATION
OBJECTIVE:  Vital Signs Last 24 Hrs  T(C): 36.6 (10 Apr 2024 10:48), Max: 36.6 (10 Apr 2024 10:48)  T(F): 97.9 (10 Apr 2024 10:48), Max: 97.9 (10 Apr 2024 10:48)  HR: 53 (10 Apr 2024 10:48) (53 - 53)  BP: 153/91 (10 Apr 2024 10:48) (153/91 - 153/91)    Physical Exam:  Gen: NAD, well-appearing, AAOx3   Abd: Soft, nontender  Ext: Right wrist swollen, TTP at medial wrist. No bruising or skin changes. Pain on flexion/extension of fingers. Patient reports difficulty deviating wrist, supinating, and extending/flexing wrist due to pain.  CV: Right Radial pulse intact 2+, capillary refill noted

## 2024-04-10 NOTE — ED PROVIDER NOTE - PATIENT PORTAL LINK FT
You can access the FollowMyHealth Patient Portal offered by [facility] by registering at the following website: [email or web address]. By joining [email or web address]’s FollowMyHealth portal, you will also be able to view your health information using other applications (apps) compatible with our system.

## 2024-04-10 NOTE — ED PROVIDER NOTE - CLINICAL SUMMARY MEDICAL DECISION MAKING FREE TEXT BOX
27 yo woman presents with significant wrist pain s/p hitting a wall 2 days ago. Patient reports 9/10 pain, PE reveals limited mobility of the right wrist, radial pulse intact 2+ with capillary refill. Of note patient has hx cerebral venous sinus thrombosis - denies HA at this time. She did not take her Eliquis Tx this AM.     Plan for Xray of wrist, Tylenol and Tramadol for pain. Will re-assess 29 yo woman presents with significant wrist pain s/p hitting a wall 2 days ago. Patient reports 9/10 pain, PE reveals limited mobility of the right wrist, radial pulse intact 2+ with capillary refill. Of note patient has hx cerebral venous sinus thrombosis - denies HA at this time. She did not take her Eliquis Tx this AM.     Plan for Xray of wrist, Tylenol and Tramadol for pain. Will re-assess    Wrist X-ray doesn't reveal any acute signs of fracture. Patient given wrist splint and

## 2024-04-10 NOTE — ED PROVIDER NOTE - CARE PROVIDER_API CALL
Pantera Chu  Plastic Surgery  [address] [place] [id number]  Phone: [phone]  Fax: [phone]  Follow Up Time: 1-3 Days

## 2024-04-12 ENCOUNTER — APPOINTMENT (OUTPATIENT)
Age: 29
End: 2024-04-12
Payer: MEDICAID

## 2024-04-12 VITALS
BODY MASS INDEX: 46.53 KG/M2 | DIASTOLIC BLOOD PRESSURE: 87 MMHG | HEIGHT: 66.5 IN | SYSTOLIC BLOOD PRESSURE: 145 MMHG | HEART RATE: 56 BPM | WEIGHT: 293 LBS

## 2024-04-12 DIAGNOSIS — M25.531 PAIN IN RIGHT WRIST: ICD-10-CM

## 2024-04-12 PROBLEM — Z86.718 PERSONAL HISTORY OF OTHER VENOUS THROMBOSIS AND EMBOLISM: Chronic | Status: ACTIVE | Noted: 2024-04-10

## 2024-04-12 PROCEDURE — 20526 THER INJECTION CARP TUNNEL: CPT | Mod: 59,RT

## 2024-04-12 PROCEDURE — 73130 X-RAY EXAM OF HAND: CPT | Mod: 50

## 2024-04-12 PROCEDURE — 99215 OFFICE O/P EST HI 40 MIN: CPT | Mod: 25

## 2024-04-12 PROCEDURE — 29125 APPL SHORT ARM SPLINT STATIC: CPT | Mod: RT

## 2024-04-12 NOTE — ASSESSMENT
[FreeTextEntry1] : ASSESSMENT: The patient comes in today with acute onset of right wrist pain subsequent to an injury on 4-3 unfortunately now complicated by tenderness at the level of the distal radius.  We have discussed potential of nondisplaced distal radius fracture injury necessitating proper splinting and nonweightbearing.  We have discussed her new onset carpal tunnel likely from contusion.  At this point in time she elects for a separate injection for this as well   The patient was adequately and thoroughly informed of my assessment of their current condition(s).  - This may diminish bodily function for the extremity. We discussed prognosis, tx modalities including operative and nonoperative options for the above diagnostic assessment. As always, 2nd opinion is always provided as an option.  When accessible, I was able to review other physicians note(s) including reviewing other tests, imaging results as well as personally view these results for my own interpretation.   Injection:   The risks and benefits of a steroid injection were discussed in detail. The risks include but are not limited to: pain, infection, swelling, flare response, bleeding, subcutaneous fat atrophy, skin depigmentation and/or elevation of blood sugar. The risk of incomplete resolution of symptoms, recurrence and additional intervention was reviewed and considered by the patient. The patient agreed to proceed and under a sterile prep, I injected 1 unit 6mg into 1 cc of a combination of Celestone and Lidocaine into the right carpal tunnel. The patient tolerated the injection well. The patient was adequately and thoroughly informed of my assessment of their current condition(s).  DISCUSSION: 1.  Right carpal tunnel injection.  For the right wrist distal radius fracture concern today she was placed in a right wrist splint.  Placement was tolerated well 2.  Follow-up 3 weeks for repeat x-rays of right wrist 3. [x]

## 2024-04-12 NOTE — PHYSICAL EXAM
[de-identified] : Examination of the [right] wrist reveals tenderness at the level of the distal radius with swelling at the wrist.  DRUJ is stable on assessment.  Mild tenderness at the radiocarpal juncture however Mcgovern test does not elicit a clunk There is stiffness with digital motion otherwise there is an intact neurovascular examination. Examination of the [right] wrist reveals discomfort with compression at the level of the volar carpal tunnel eliciting numbness/tingling throughout the fingertips   [de-identified] : [4] views of [bilateral hands and wrists] were obtained today in my office and were seen by me and discussed with the patient.  These show concern for nondisplaced right distal radius injury.  Clenched fist reveals physiologic widening

## 2024-04-12 NOTE — HISTORY OF PRESENT ILLNESS
[FreeTextEntry1] : Karla is a 28-year-old female who unfortunately suffered a injury to her right wrist on 4-3.  She presented to the emergency room and is now here for follow-up.  She complains of numbness and tingling as well new onset.

## 2024-04-17 ENCOUNTER — APPOINTMENT (OUTPATIENT)
Dept: ORTHOPEDIC SURGERY | Facility: CLINIC | Age: 29
End: 2024-04-17
Payer: MEDICAID

## 2024-04-17 VITALS — BODY MASS INDEX: 46.53 KG/M2 | HEIGHT: 66.5 IN | WEIGHT: 293 LBS

## 2024-04-17 DIAGNOSIS — S39.012A STRAIN OF MUSCLE, FASCIA AND TENDON OF LOWER BACK, INITIAL ENCOUNTER: ICD-10-CM

## 2024-04-17 PROCEDURE — 99204 OFFICE O/P NEW MOD 45 MIN: CPT

## 2024-04-17 PROCEDURE — 72110 X-RAY EXAM L-2 SPINE 4/>VWS: CPT

## 2024-04-17 PROCEDURE — 99214 OFFICE O/P EST MOD 30 MIN: CPT

## 2024-04-17 RX ORDER — LIDOCAINE 5% 700 MG/1
5 PATCH TOPICAL
Qty: 14 | Refills: 1 | Status: ACTIVE | COMMUNITY
Start: 2024-04-17 | End: 1900-01-01

## 2024-04-17 NOTE — HISTORY OF PRESENT ILLNESS
[de-identified] : Chief Complaint: Back pain   History of Present Illness: 28-year-old female presenting today for acute exacerbation of her chronic low back pain.  She states that she has had back pain in the mid lumbar spine for a year now on and off but feels that over the past 2 months as it has significantly worsened.  States that it is aching in nature and bilateral paraspinal cervical musculature is she has difficulty with standing walking lifting she feels that she has significant spasms in the low back.  She has some pain rating down the anterior lateral aspect of the thigh to the level of the knee which has occurred over the past couple weeks but feels that those symptoms are less frequent than the severe low back pain.  She rates pain about 8 out of 10 in severity.  She denies any bowel or bladder incontinence she denies any lower extremity numbness or tingling or saddle anesthesia.   Past medical history, past surgical history, medications, allergies, social history, and family history are as documented in our records today.  Notable items include: None   Review of Systems: I have reviewed the patient's documented Review of Systems data today, I concur with this documentation.

## 2024-04-17 NOTE — DISCUSSION/SUMMARY
[de-identified] : Medrol Dosepak She will continue her Tylenol 1000 mg every 8 hours for pain control Robaxin 750 mg at nighttime for muscle spasm Physical therapy 2-3 times per week for 6 to 8 weeks [facility] service referral was placed to assist with scheduling and location I will see her back in 6 weeks

## 2024-04-17 NOTE — REVIEW OF SYSTEMS
[Joint Pain] : joint pain [Joint Stiffness] : joint stiffness [Joint Swelling] : joint swelling [Negative] : Heme/Lymph [FreeTextEntry9] : Back

## 2024-04-17 NOTE — PHYSICAL EXAM
[de-identified] : CONSTITUTIONAL: Patient is a very pleasant individual who is well-nourished and appears stated age. PSYCHIATRIC: Alert and oriented times three and in no apparent distress, and participates with orthopedic evaluation well. HEAD: Atraumatic and nonsyndromic in appearance. EENT: No thyromegaly, EOMI. RESPIRATORY: Respiratory rate is regular, not dyspneic on examination. LYMPHATICS: There is no cervical or axillary lymphadenopathy. INTEGUMENTARY: Skin is clean, dry, and intact to bilateral lower extremities. VASCULAR: There is brisk capillary refill about the bilateral Lower Extremities with 2+ DP Pulse  Palpation: Tenderness along the mid and lower lumbar spine bilateral paraspinal musculature Muscle Strength Testing: Hip Flexion: 5/5 B/L Knee Extension: 5/5 B/L Knee Flexion: 5/5 B/L Dorsiflexion: 5/5 B/L EHL: 5/5 B/L Plantarflexion: 5/5 B/L  Sensation: SILT L2-S1 B/L except: None  Reflexes: 2+ Quadriceps/Achilles  Gait: Normal gait w/o assistance Able to perform tandem gait Able to Heel Walk Able to Toe Walk  Special Testing:  Negative SLR BLLE Negative clonus BLLE  [de-identified] : Standing AP, lateral, flexion and extension radiographs of the lumbar spine performed on 4/17/2024 in the Radiology Department at [facility] for the indication of low back pain are reviewed.  These studies demonstrate no deformity on AP film lateral radiographs demonstrates well-maintained lumbar lordosis well-maintained disc height no significant spondylosis or spondylolisthesis Zygomaticofacial Flap Text: Given the location of the defect, shape of the defect and the proximity to free margins a zygomaticofacial flap was deemed most appropriate for repair.  Using a sterile surgical marker, the appropriate flap was drawn incorporating the defect and placing the expected incisions within the relaxed skin tension lines where possible. The area thus outlined was incised deep to adipose tissue with a #15 scalpel blade with preservation of a vascular pedicle.  The skin margins were undermined to an appropriate distance in all directions utilizing iris scissors.  The flap was then placed into the defect and anchored with interrupted buried subcutaneous sutures.

## 2024-04-17 NOTE — ASSESSMENT
[FreeTextEntry1] : 28-year-old female with lumbar strain  The patient and I had an extensive discussion today regarding her concerns.  At present, I am not recommending a surgical intervention.  We discussed non-surgical options that may be available to the patient.  Conservative treatment was discussed with the patient at length. Anticipatory guidance regarding disease process, avoidance of acute exacerbation this was discussed at length and all patients commenting concerns were answered to the patient's satisfaction. Physical therapy for decrease pain and increase function was ordered. Intermittent use of acetaminophen 500 mg 2 tablets t.i.d. p.r.n. Home exercise including stretching on a daily basis for 20-30 minutes was recommended. Heat, ice, topical were discussed as needed.

## 2024-05-03 ENCOUNTER — APPOINTMENT (OUTPATIENT)
Dept: ORTHOPEDIC SURGERY | Facility: CLINIC | Age: 29
End: 2024-05-03
Payer: MEDICAID

## 2024-05-03 VITALS
WEIGHT: 293 LBS | DIASTOLIC BLOOD PRESSURE: 95 MMHG | HEIGHT: 66.5 IN | SYSTOLIC BLOOD PRESSURE: 151 MMHG | BODY MASS INDEX: 46.53 KG/M2 | HEART RATE: 64 BPM

## 2024-05-03 DIAGNOSIS — S52.501D UNSPECIFIED FRACTURE OF THE LOWER END OF RIGHT RADIUS, SUBSEQUENT ENCOUNTER FOR CLOSED FRACTURE WITH ROUTINE HEALING: ICD-10-CM

## 2024-05-03 DIAGNOSIS — M65.9 SYNOVITIS AND TENOSYNOVITIS, UNSPECIFIED: ICD-10-CM

## 2024-05-03 DIAGNOSIS — S69.80XD OTHER SPECIFIED INJURIES OF UNSPECIFIED WRIST, HAND AND FINGER(S), SUBSEQUENT ENCOUNTER: ICD-10-CM

## 2024-05-03 PROCEDURE — 73110 X-RAY EXAM OF WRIST: CPT | Mod: RT

## 2024-05-03 PROCEDURE — 20550 NJX 1 TENDON SHEATH/LIGAMENT: CPT | Mod: RT

## 2024-05-03 PROCEDURE — 99214 OFFICE O/P EST MOD 30 MIN: CPT | Mod: 25

## 2024-05-03 PROCEDURE — 20605 DRAIN/INJ JOINT/BURSA W/O US: CPT | Mod: 59,RT

## 2024-05-03 NOTE — HISTORY OF PRESENT ILLNESS
[FreeTextEntry1] : Karla is a 28-year-old female who unfortunately suffered a injury to her right wrist on 4-3.  She presented to the emergency room and is now here for follow-up.  She complains of numbness and tingling as well new onset which has improved tremendously after prior injection.  At this point in time she complains of ulnar-sided discomfort.  Bracing has been helpful.

## 2024-05-03 NOTE — ASSESSMENT
[FreeTextEntry1] : ASSESSMENT: The patient comes in today with acute onset of right wrist pain subsequent to an injury on 4-3 unfortunately now complicated by tenderness at the level of the distal radius.  We have discussed potential of nondisplaced distal radius fracture injury necessitating proper splinting and nonweightbearing.  She has done well after carpal tunnel injection.  At this point in time she does have significant ulnar-sided discomfort with irritation of ECU and TFCC.  With this in mind she will continue bracing.  She would like to trial occupational therapy and injections   The patient was adequately and thoroughly informed of my assessment of their current condition(s).  - This may diminish bodily function for the extremity. We discussed prognosis, tx modalities including operative and nonoperative options for the above diagnostic assessment. As always, 2nd opinion is always provided as an option.  When accessible, I was able to review other physicians note(s) including reviewing other tests, imaging results as well as personally view these results for my own interpretation.   Injection:   The risks and benefits of a steroid injection were discussed in detail. The risks include but are not limited to: pain, infection, swelling, flare response, bleeding, subcutaneous fat atrophy, skin depigmentation and/or elevation of blood sugar. The risk of incomplete resolution of symptoms, recurrence and additional intervention was reviewed and considered by the patient. The patient agreed to proceed and under a sterile prep, I injected 1 unit 6mg into 1 cc of a combination of Celestone and Lidocaine into the right wrist joint, right ECU subs sheath l. The patient tolerated the injection well. The patient was adequately and thoroughly informed of my assessment of their current condition(s).  DISCUSSION: 1.  Injections as above.  Bracing.  Follow-up 8 weeks post occupational therapy.

## 2024-05-03 NOTE — PHYSICAL EXAM
[de-identified] : Examination of the [right] wrist reveals tenderness at the distal ulna dorsal with pain upon compression of the fovea. There is discomfort with ulnar grinding as well as with ends of pronation and supination at the ulnar wrist. There is discomfort with compression of the dorsal triquetrum. Examination of the [right] wrist reveals discomfort with compression at the level of the ECU. Synergy test elicits discomfort as well as resisted ulnar wrist extension. [de-identified] :  [3] views of [right] wrist were obtained today in my office and were seen by me and discussed with the patient. These are grossly negative.  Distal radius fracture?

## 2024-05-16 ENCOUNTER — APPOINTMENT (OUTPATIENT)
Dept: OBGYN | Facility: CLINIC | Age: 29
End: 2024-05-16

## 2024-05-29 ENCOUNTER — APPOINTMENT (OUTPATIENT)
Dept: ORTHOPEDIC SURGERY | Facility: CLINIC | Age: 29
End: 2024-05-29

## 2024-06-17 ENCOUNTER — APPOINTMENT (OUTPATIENT)
Dept: OBGYN | Facility: CLINIC | Age: 29
End: 2024-06-17

## 2024-06-17 VITALS
WEIGHT: 293 LBS | DIASTOLIC BLOOD PRESSURE: 60 MMHG | BODY MASS INDEX: 46.53 KG/M2 | SYSTOLIC BLOOD PRESSURE: 124 MMHG | HEIGHT: 66.5 IN

## 2024-06-17 DIAGNOSIS — Z86.718 PERSONAL HISTORY OF OTHER VENOUS THROMBOSIS AND EMBOLISM: ICD-10-CM

## 2024-06-17 DIAGNOSIS — R41.840 ATTENTION AND CONCENTRATION DEFICIT: ICD-10-CM

## 2024-06-17 DIAGNOSIS — Z01.419 ENCOUNTER FOR GYNECOLOGICAL EXAMINATION (GENERAL) (ROUTINE) W/OUT ABNORMAL FINDINGS: ICD-10-CM

## 2024-06-17 DIAGNOSIS — Z87.42 PERSONAL HISTORY OF OTHER DISEASES OF THE FEMALE GENITAL TRACT: ICD-10-CM

## 2024-06-17 DIAGNOSIS — Z86.69 PERSONAL HISTORY OF OTHER DISEASES OF THE NERVOUS SYSTEM AND SENSE ORGANS: ICD-10-CM

## 2024-06-17 DIAGNOSIS — Z83.3 FAMILY HISTORY OF DIABETES MELLITUS: ICD-10-CM

## 2024-06-17 DIAGNOSIS — Z82.49 FAMILY HISTORY OF ISCHEMIC HEART DISEASE AND OTHER DISEASES OF THE CIRCULATORY SYSTEM: ICD-10-CM

## 2024-06-17 DIAGNOSIS — F17.200 NICOTINE DEPENDENCE, UNSPECIFIED, UNCOMPLICATED: ICD-10-CM

## 2024-06-17 DIAGNOSIS — Z80.3 FAMILY HISTORY OF MALIGNANT NEOPLASM OF BREAST: ICD-10-CM

## 2024-06-17 LAB
HCG UR QL: NEGATIVE
QUALITY CONTROL: YES

## 2024-06-17 PROCEDURE — 99385 PREV VISIT NEW AGE 18-39: CPT

## 2024-06-17 PROCEDURE — 99459 PELVIC EXAMINATION: CPT

## 2024-06-17 PROCEDURE — 99202 OFFICE O/P NEW SF 15 MIN: CPT | Mod: 25

## 2024-06-17 PROCEDURE — 81025 URINE PREGNANCY TEST: CPT

## 2024-06-17 RX ORDER — METHOCARBAMOL 750 MG/1
750 TABLET, FILM COATED ORAL
Qty: 30 | Refills: 0 | Status: COMPLETED | COMMUNITY
Start: 2024-04-17 | End: 2024-06-17

## 2024-06-17 RX ORDER — CLINDAMYCIN HYDROCHLORIDE 300 MG/1
300 CAPSULE ORAL
Qty: 40 | Refills: 0 | Status: COMPLETED | COMMUNITY
Start: 2016-10-28 | End: 2024-06-17

## 2024-06-17 RX ORDER — ALBUTEROL SULFATE 90 UG/1
108 (90 BASE) AEROSOL, METERED RESPIRATORY (INHALATION)
Qty: 18 | Refills: 0 | Status: COMPLETED | COMMUNITY
Start: 2016-10-28 | End: 2024-06-17

## 2024-06-17 RX ORDER — METRONIDAZOLE 500 MG/1
500 TABLET ORAL
Qty: 14 | Refills: 0 | Status: COMPLETED | COMMUNITY
Start: 2016-11-01 | End: 2024-06-17

## 2024-06-17 RX ORDER — IBUPROFEN 600 MG/1
600 TABLET, FILM COATED ORAL
Qty: 40 | Refills: 0 | Status: DISCONTINUED | COMMUNITY
Start: 2017-02-17 | End: 2024-06-17

## 2024-06-17 RX ORDER — METHYLPREDNISOLONE 4 MG/1
4 TABLET ORAL
Qty: 1 | Refills: 0 | Status: COMPLETED | COMMUNITY
Start: 2024-04-17 | End: 2024-06-17

## 2024-06-17 RX ORDER — ASPIRIN 325 MG/1
325 TABLET, FILM COATED ORAL DAILY
Qty: 30 | Refills: 0 | Status: DISCONTINUED | COMMUNITY
Start: 2017-03-31 | End: 2024-06-17

## 2024-06-17 NOTE — HISTORY OF PRESENT ILLNESS
[Y] : Patient is sexually active [N] : Patient denies prior pregnancies [Menarche Age: ____] : age at menarche was [unfilled] [PGHxTotal] : 0 [PGHxFullTerm] : 0 [PGHxPremature] : 0 [PGHxAbortions] : 0 [[facility]xLiving] : 0 [PGHxABInduced] : 0 [PGHxABSpont] : 0 [PGHxEctopic] : 0 [PGHxMultBirths] : 0 [Frequency: Q ___ days] : menstrual periods occur approximately every [unfilled] days

## 2024-06-17 NOTE — PHYSICAL EXAM
[Chaperone Present] : A chaperone was present in the examining room during all aspects of the physical examination [[id number]] : A chaperone was present during the pelvic exam. [Appropriately responsive] : appropriately responsive [Alert] : alert [No Acute Distress] : no acute distress [Soft] : soft [Non-tender] : non-tender [Non-distended] : non-distended [No HSM] : No HSM [No Lesions] : no lesions [No Mass] : no mass [Oriented x3] : oriented x3 [Examination Of The Breasts] : a normal appearance [No Masses] : no breast masses were palpable [Labia Majora] : normal [Labia Minora] : normal [Normal] : normal [FreeTextEntry6] : Non-palpable

## 2024-06-18 LAB — HPV HIGH+LOW RISK DNA PNL CVX: NOT DETECTED

## 2024-06-19 ENCOUNTER — TRANSCRIPTION ENCOUNTER (OUTPATIENT)
Age: 29
End: 2024-06-19

## 2024-06-21 LAB — CYTOLOGY CVX/VAG DOC THIN PREP: NORMAL

## 2024-06-24 ENCOUNTER — OUTPATIENT (OUTPATIENT)
Dept: OUTPATIENT SERVICES | Facility: HOSPITAL | Age: 29
LOS: 1 days | End: 2024-06-24

## 2024-06-24 ENCOUNTER — APPOINTMENT (OUTPATIENT)
Dept: ULTRASOUND IMAGING | Facility: CLINIC | Age: 29
End: 2024-06-24
Payer: MEDICAID

## 2024-06-24 DIAGNOSIS — N91.5 OLIGOMENORRHEA, UNSPECIFIED: ICD-10-CM

## 2024-06-24 DIAGNOSIS — Z98.890 OTHER SPECIFIED POSTPROCEDURAL STATES: Chronic | ICD-10-CM

## 2024-06-24 PROCEDURE — 76830 TRANSVAGINAL US NON-OB: CPT | Mod: 26

## 2024-06-24 PROCEDURE — 76856 US EXAM PELVIC COMPLETE: CPT | Mod: 26

## 2024-06-25 LAB
DHEA-S SERPL-MCNC: 154 UG/DL
FSH SERPL-MCNC: 6.8 IU/L
HCG-TM SERPL-MCNC: <1 MIU/ML
INSULIN P FAST SERPL-ACNC: 20.4 UU/ML
LH SERPL-ACNC: 4.8 IU/L
PROLACTIN SERPL-MCNC: 17.7 NG/ML
TSH SERPL-ACNC: 1.97 UIU/ML

## 2024-06-27 LAB
TESTOST FREE SERPL-MCNC: 1.3 PG/ML
TESTOST SERPL-MCNC: 20.9 NG/DL

## 2024-06-28 LAB — 17OHP SERPL-MCNC: 15 NG/DL

## 2024-07-01 ENCOUNTER — APPOINTMENT (OUTPATIENT)
Dept: OBGYN | Facility: CLINIC | Age: 29
End: 2024-07-01
Payer: MEDICAID

## 2024-07-01 VITALS
WEIGHT: 293 LBS | DIASTOLIC BLOOD PRESSURE: 84 MMHG | HEIGHT: 66 IN | BODY MASS INDEX: 47.09 KG/M2 | SYSTOLIC BLOOD PRESSURE: 126 MMHG

## 2024-07-01 DIAGNOSIS — N91.5 OLIGOMENORRHEA, UNSPECIFIED: ICD-10-CM

## 2024-07-01 DIAGNOSIS — E66.9 OBESITY, UNSPECIFIED: ICD-10-CM

## 2024-07-01 PROCEDURE — 99213 OFFICE O/P EST LOW 20 MIN: CPT

## 2024-07-02 LAB
ESTIMATED AVERAGE GLUCOSE: 97 MG/DL
HBA1C MFR BLD HPLC: 5 %

## 2024-07-10 ENCOUNTER — APPOINTMENT (OUTPATIENT)
Dept: FAMILY MEDICINE | Facility: CLINIC | Age: 29
End: 2024-07-10

## 2024-08-07 ENCOUNTER — APPOINTMENT (OUTPATIENT)
Dept: ORTHOPEDIC SURGERY | Facility: CLINIC | Age: 29
End: 2024-08-07

## 2024-10-16 NOTE — ED ADULT TRIAGE NOTE - BP NONINVASIVE SYSTOLIC (MM HG)
Can try OTC melatonin 3mg nightly.  He may schedule a visit to discuss this issue further if not helped by melatonin.  Ashwin Flannery MD on 10/16/2024 at 2:42 PM     142

## 2024-11-21 ENCOUNTER — EMERGENCY (EMERGENCY)
Facility: HOSPITAL | Age: 29
LOS: 1 days | Discharge: HOME | End: 2024-11-21
Attending: EMERGENCY MEDICINE
Payer: MEDICAID

## 2024-11-21 VITALS
OXYGEN SATURATION: 98 % | HEIGHT: 66 IN | SYSTOLIC BLOOD PRESSURE: 121 MMHG | TEMPERATURE: 98 F | DIASTOLIC BLOOD PRESSURE: 94 MMHG | HEART RATE: 91 BPM | RESPIRATION RATE: 16 BRPM

## 2024-11-21 DIAGNOSIS — Z98.890 OTHER SPECIFIED POSTPROCEDURAL STATES: Chronic | ICD-10-CM

## 2024-11-21 PROCEDURE — 93010 ELECTROCARDIOGRAM REPORT: CPT

## 2024-11-21 PROCEDURE — 93005 ELECTROCARDIOGRAM TRACING: CPT

## 2024-11-21 PROCEDURE — 99284 EMERGENCY DEPT VISIT MOD MDM: CPT

## 2024-11-21 PROCEDURE — 99284 EMERGENCY DEPT VISIT MOD MDM: CPT | Mod: 25

## 2024-11-21 RX ORDER — CEPHALEXIN 125 MG/5ML
1 SUSPENSION, RECONSTITUTED, ORAL (ML) ORAL
Qty: 20 | Refills: 0
Start: 2024-11-21 | End: 2024-11-25

## 2024-11-21 RX ORDER — CLINDAMYCIN PHOSPHATE 150 MG/ML
1 VIAL (ML) INJECTION
Qty: 28 | Refills: 0
Start: 2024-11-21 | End: 2024-11-27

## 2024-11-21 RX ORDER — IBUPROFEN 200 MG
1 TABLET ORAL
Qty: 20 | Refills: 0
Start: 2024-11-21 | End: 2024-11-25

## 2024-11-21 NOTE — ED ADULT TRIAGE NOTE - CHIEF COMPLAINT QUOTE
c/o burst abscess to L groin. States there is a hole with intermittent purulent drainage.  Reports subjective fever, chest pain. denies N/V. PMHx of MRSA.

## 2024-11-21 NOTE — ED PROVIDER NOTE - NSFOLLOWUPINSTRUCTIONS_ED_ALL_ED_FT
Patient education: Skin abscess (The Basics)  Written by the doctors and editors at [facility]  Please read the Disclaimer at the end of this page.    What is a skin abscess?    A skin abscess is a painful bump that forms when pus collects under the skin (picture 1). It looks similar to a pimple, but is usually much larger. Skin abscesses most often form when there is cut or nick in the skin that allows bacteria from the skin's surface to get in. Sometimes, an ingrown hair can cause a skin abscess to form.    The bacterial infection causes the skin to become swollen and painful. It also makes the skin look red or darker in color. Pus forms as the body tries to fight off the bacteria.    Less often, a skin abscess might be caused by another type of germ, like a virus, fungus, or parasite.    What are the symptoms of a skin abscess?    Symptoms might include:    ?A large bump that looks like a pimple – The bump might drain fluid or pus.    ?Swollen or red skin around the abscess    ?Pain, especially when touched    In some cases, a skin abscess might also cause fever or chills. But this is uncommon.    Skin abscesses can form anywhere on the body. But they are most common on the arms, legs, back, chest, and belly.    Will I need tests?    Probably not. In most cases, your doctor or nurse can tell if you have a skin abscess by asking about your symptoms and doing an exam.    In rare cases when tests are needed, they might include:    ?Lab tests    ?Imaging tests – These create pictures of the inside of the body.    In some cases, your doctor or nurse might want to do lab tests on the pus from the abscess.    How is a skin abscess treated?    With most abscesses, a doctor will make a small cut on the surface of the abscess to drain out the pus. For small abscesses that are draining pus on their own, cutting into the abscess might not be needed. "Small" usually means less than 3/4 inch (2 cm) in size.    In most cases, your doctor or nurse will also prescribe an antibiotic medicine. Antibiotics help kill the bacteria that caused the abscess and keep the skin from getting infected again. If you get antibiotics, finish all of the medicine and take it exactly as instructed. Never skip doses or stop taking the medicine without talking to your doctor or nurse.    Is there anything I can do on my own to feel better?    Yes. To relieve symptoms and help your skin abscess drain, you can put a warm, wet compress on the area:    ?Wet a clean washcloth with warm water, and put it over your abscess.    ?When the washcloth cools, reheat it with warm water and put it back over the abscess.    ?Repeat these steps for about 15 minutes, and try to do this 4 times a day.    Do not try to squeeze or pop an abscess. This can make it worse.    When should I call the doctor?    Call your doctor or nurse if:    ?Your abscess is getting bigger.    ?You have signs that your infection is getting worse – These include a fever of 100.4°F (38°C) or higher, or more redness around the abscess.    ?Your abscess is on your breast or in your genital or anal area.

## 2024-11-21 NOTE — ED PROVIDER NOTE - OBJECTIVE STATEMENT
PT with no SPMHx presents to the ED with complaint of Lt sided groin wound. PT states that she has had gradual onset of dull pain over area for the last 3 days. Pt states that symptoms are moderate, not made better or worse by anything. Pt states that the morning wound spongiosely drained and has not had any drainage for the last few he. Pt admits to subjective fevers, dines HA, dizziness, swelling, urinary symptoms cough SOB, CP, ABD pain.

## 2024-11-21 NOTE — ED PROVIDER NOTE - ADDITIONAL NOTES AND INSTRUCTIONS:
PT was evaluated At [facility] ED and was found to have a condition that warranted time of to rest and heal from WORK/SCHOOL.   Robert Edge PA-C

## 2024-11-21 NOTE — ED PROVIDER NOTE - SKIN, MLM
Skin normal color for race, warm, dry and intact. No evidence of rash.   1cm open wound in the Lt groin with no swelling no redness, no fluctuance, no drainage

## 2024-11-21 NOTE — ED PROVIDER NOTE - PATIENT PORTAL LINK FT
You can access the FollowMyHealth Patient Portal offered by [facility] by registering at the following website: [email or web address]. By joining [employer]’s FollowMyHealth portal, you will also be able to view your health information using other applications (apps) compatible with our system.

## 2024-11-21 NOTE — ED PROVIDER NOTE - ATTENDING APP SHARED VISIT CONTRIBUTION OF CARE
Diagnosis: S/P release of trigger thumb of right hand [M65.311], trigger index finger of right hand, trigger middle finger of right hand     Type of Insurance: PPO     Authorized # of Visits:  [id number]         Next MD visit: none scheduled  Fall Risk: standard to 8 pounds, 2-point to 4 pounds for ease with food preparation. Plan: Continue per plan of care.     Charges: MT, TEx2      Total Timed Treatment: 50 min  Total Treatment Time: 55 min 29-year-old female presents with rupture abscess of the left groin.  Subjective fever.  No additional intervention needed as abscess already starting to drain.  Warm compress, wound care instruction given.  DC home on clindamycin.

## 2024-11-21 NOTE — ED PROVIDER NOTE - CLINICAL SUMMARY MEDICAL DECISION MAKING FREE TEXT BOX
PT with no SPMHx presents to the ED with complaint of Lt sided groin wound. PT states that she has had gradual onset of dull pain over area for the last 3 days. Pt states that symptoms are moderate, not made better or worse by anything. Pt states that the morning wound spongiosely drained and has not had any drainage for the last few he. Pt admits to subjective fevers, dines HA, dizziness, swelling, urinary symptoms cough SOB, CP, ABD pain. On exam Pt with 1cm area in the Lt groin with no swelling no redness, no fluctuance, no drainage at this time. No intervention indication at this time wound wo drainage, Pt to be dc home with trial of PO Abx, follow upto PCP, local wound care, pt educated about when to return to the ED if needed. PT verbalizes that he understands all instructions and results. Pt informed that ED is open and available 24/7 365 days a yr, encouraged to return to the ED if they have any change in condition, or feel the need for revaluation.

## 2025-01-31 ENCOUNTER — EMERGENCY (EMERGENCY)
Facility: HOSPITAL | Age: 30
LOS: 1 days | Discharge: HOME | End: 2025-01-31
Attending: EMERGENCY MEDICINE
Payer: MEDICAID

## 2025-01-31 VITALS
OXYGEN SATURATION: 98 % | RESPIRATION RATE: 20 BRPM | HEART RATE: 68 BPM | TEMPERATURE: 99 F | SYSTOLIC BLOOD PRESSURE: 134 MMHG | DIASTOLIC BLOOD PRESSURE: 74 MMHG

## 2025-01-31 VITALS
HEIGHT: 66 IN | RESPIRATION RATE: 20 BRPM | TEMPERATURE: 98 F | SYSTOLIC BLOOD PRESSURE: 123 MMHG | HEART RATE: 108 BPM | OXYGEN SATURATION: 98 % | DIASTOLIC BLOOD PRESSURE: 70 MMHG | WEIGHT: 293 LBS

## 2025-01-31 DIAGNOSIS — Z98.890 OTHER SPECIFIED POSTPROCEDURAL STATES: Chronic | ICD-10-CM

## 2025-01-31 LAB
ALBUMIN SERPL ELPH-MCNC: 3.9 G/DL — SIGNIFICANT CHANGE UP (ref 3.3–5.2)
ALP SERPL-CCNC: 64 U/L — SIGNIFICANT CHANGE UP (ref 40–120)
ALT FLD-CCNC: 17 U/L — SIGNIFICANT CHANGE UP
ANION GAP SERPL CALC-SCNC: 14 MMOL/L — SIGNIFICANT CHANGE UP (ref 5–17)
AST SERPL-CCNC: 15 U/L — SIGNIFICANT CHANGE UP
BASOPHILS # BLD AUTO: 0.04 K/UL — SIGNIFICANT CHANGE UP (ref 0–0.2)
BASOPHILS NFR BLD AUTO: 0.3 % — SIGNIFICANT CHANGE UP (ref 0–2)
BILIRUB SERPL-MCNC: 0.3 MG/DL — LOW (ref 0.4–2)
BUN SERPL-MCNC: 9.1 MG/DL — SIGNIFICANT CHANGE UP (ref 8–20)
CALCIUM SERPL-MCNC: 9 MG/DL — SIGNIFICANT CHANGE UP (ref 8.4–10.5)
CHLORIDE SERPL-SCNC: 101 MMOL/L — SIGNIFICANT CHANGE UP (ref 96–108)
CO2 SERPL-SCNC: 24 MMOL/L — SIGNIFICANT CHANGE UP (ref 22–29)
CREAT SERPL-MCNC: 0.63 MG/DL — SIGNIFICANT CHANGE UP (ref 0.5–1.3)
D DIMER BLD IA.RAPID-MCNC: 165 NG/ML DDU — SIGNIFICANT CHANGE UP
EGFR: 125 ML/MIN/1.73M2 — SIGNIFICANT CHANGE UP
EOSINOPHIL # BLD AUTO: 0.26 K/UL — SIGNIFICANT CHANGE UP (ref 0–0.5)
EOSINOPHIL NFR BLD AUTO: 2.1 % — SIGNIFICANT CHANGE UP (ref 0–6)
FLUAV AG NPH QL: SIGNIFICANT CHANGE UP
FLUBV AG NPH QL: SIGNIFICANT CHANGE UP
GLUCOSE SERPL-MCNC: 86 MG/DL — SIGNIFICANT CHANGE UP (ref 70–99)
HCG SERPL-ACNC: <4 MIU/ML — SIGNIFICANT CHANGE UP
HCT VFR BLD CALC: 46.9 % — HIGH (ref 34.5–45)
HETEROPH AB TITR SER AGGL: POSITIVE
HGB BLD-MCNC: 15.3 G/DL — SIGNIFICANT CHANGE UP (ref 11.5–15.5)
IMM GRANULOCYTES NFR BLD AUTO: 0.3 % — SIGNIFICANT CHANGE UP (ref 0–0.9)
LIDOCAIN IGE QN: 20 U/L — LOW (ref 22–51)
LYMPHOCYTES # BLD AUTO: 15.9 % — SIGNIFICANT CHANGE UP (ref 13–44)
LYMPHOCYTES # BLD AUTO: 2.02 K/UL — SIGNIFICANT CHANGE UP (ref 1–3.3)
MCHC RBC-ENTMCNC: 27.2 PG — SIGNIFICANT CHANGE UP (ref 27–34)
MCHC RBC-ENTMCNC: 32.6 G/DL — SIGNIFICANT CHANGE UP (ref 32–36)
MCV RBC AUTO: 83.5 FL — SIGNIFICANT CHANGE UP (ref 80–100)
MONOCYTES # BLD AUTO: 0.83 K/UL — SIGNIFICANT CHANGE UP (ref 0–0.9)
MONOCYTES NFR BLD AUTO: 6.6 % — SIGNIFICANT CHANGE UP (ref 2–14)
NEUTROPHILS # BLD AUTO: 9.48 K/UL — HIGH (ref 1.8–7.4)
NEUTROPHILS NFR BLD AUTO: 74.8 % — SIGNIFICANT CHANGE UP (ref 43–77)
NT-PROBNP SERPL-SCNC: <36 PG/ML — SIGNIFICANT CHANGE UP (ref 0–300)
PLATELET # BLD AUTO: 322 K/UL — SIGNIFICANT CHANGE UP (ref 150–400)
POTASSIUM SERPL-MCNC: 4.2 MMOL/L — SIGNIFICANT CHANGE UP (ref 3.5–5.3)
POTASSIUM SERPL-SCNC: 4.2 MMOL/L — SIGNIFICANT CHANGE UP (ref 3.5–5.3)
PROT SERPL-MCNC: 7.8 G/DL — SIGNIFICANT CHANGE UP (ref 6.6–8.7)
RBC # BLD: 5.62 M/UL — HIGH (ref 3.8–5.2)
RBC # FLD: 13 % — SIGNIFICANT CHANGE UP (ref 10.3–14.5)
RSV RNA NPH QL NAA+NON-PROBE: SIGNIFICANT CHANGE UP
SARS-COV-2 RNA SPEC QL NAA+PROBE: SIGNIFICANT CHANGE UP
SODIUM SERPL-SCNC: 139 MMOL/L — SIGNIFICANT CHANGE UP (ref 135–145)
WBC # BLD: 12.67 K/UL — HIGH (ref 3.8–10.5)
WBC # FLD AUTO: 12.67 K/UL — HIGH (ref 3.8–10.5)

## 2025-01-31 PROCEDURE — 86663 EPSTEIN-BARR ANTIBODY: CPT

## 2025-01-31 PROCEDURE — 96374 THER/PROPH/DIAG INJ IV PUSH: CPT | Mod: XU

## 2025-01-31 PROCEDURE — 71046 X-RAY EXAM CHEST 2 VIEWS: CPT

## 2025-01-31 PROCEDURE — 85025 COMPLETE CBC W/AUTO DIFF WBC: CPT

## 2025-01-31 PROCEDURE — 83690 ASSAY OF LIPASE: CPT

## 2025-01-31 PROCEDURE — 85379 FIBRIN DEGRADATION QUANT: CPT

## 2025-01-31 PROCEDURE — 99285 EMERGENCY DEPT VISIT HI MDM: CPT

## 2025-01-31 PROCEDURE — 84702 CHORIONIC GONADOTROPIN TEST: CPT

## 2025-01-31 PROCEDURE — 86664 EPSTEIN-BARR NUCLEAR ANTIGEN: CPT

## 2025-01-31 PROCEDURE — 99285 EMERGENCY DEPT VISIT HI MDM: CPT | Mod: 25

## 2025-01-31 PROCEDURE — 71046 X-RAY EXAM CHEST 2 VIEWS: CPT | Mod: 26

## 2025-01-31 PROCEDURE — 93308 TTE F-UP OR LMTD: CPT | Mod: 26

## 2025-01-31 PROCEDURE — 93005 ELECTROCARDIOGRAM TRACING: CPT

## 2025-01-31 PROCEDURE — 84484 ASSAY OF TROPONIN QUANT: CPT

## 2025-01-31 PROCEDURE — 36415 COLL VENOUS BLD VENIPUNCTURE: CPT

## 2025-01-31 PROCEDURE — 94640 AIRWAY INHALATION TREATMENT: CPT

## 2025-01-31 PROCEDURE — 86665 EPSTEIN-BARR CAPSID VCA: CPT

## 2025-01-31 PROCEDURE — 93010 ELECTROCARDIOGRAM REPORT: CPT

## 2025-01-31 PROCEDURE — 76604 US EXAM CHEST: CPT | Mod: 26

## 2025-01-31 PROCEDURE — 86308 HETEROPHILE ANTIBODY SCREEN: CPT

## 2025-01-31 PROCEDURE — 76604 US EXAM CHEST: CPT

## 2025-01-31 PROCEDURE — 87637 SARSCOV2&INF A&B&RSV AMP PRB: CPT

## 2025-01-31 PROCEDURE — 80053 COMPREHEN METABOLIC PANEL: CPT

## 2025-01-31 PROCEDURE — 83880 ASSAY OF NATRIURETIC PEPTIDE: CPT

## 2025-01-31 PROCEDURE — 93308 TTE F-UP OR LMTD: CPT

## 2025-01-31 PROCEDURE — 96375 TX/PRO/DX INJ NEW DRUG ADDON: CPT | Mod: XU

## 2025-01-31 RX ORDER — IPRATROPIUM BROMIDE AND ALBUTEROL SULFATE .5; 2.5 MG/3ML; MG/3ML
3 SOLUTION RESPIRATORY (INHALATION) ONCE
Refills: 0 | Status: COMPLETED | OUTPATIENT
Start: 2025-01-31 | End: 2025-01-31

## 2025-01-31 RX ORDER — ONDANSETRON 4 MG/1
4 TABLET, ORALLY DISINTEGRATING ORAL ONCE
Refills: 0 | Status: DISCONTINUED | OUTPATIENT
Start: 2025-01-31 | End: 2025-01-31

## 2025-01-31 RX ORDER — METOCLOPRAMIDE 10 MG/1
10 TABLET ORAL ONCE
Refills: 0 | Status: COMPLETED | OUTPATIENT
Start: 2025-01-31 | End: 2025-01-31

## 2025-01-31 RX ORDER — DEXAMETHASONE SODIUM PHOSPHATE 4 MG/ML
6 INJECTION, SOLUTION INTRA-ARTICULAR; INTRALESIONAL; INTRAMUSCULAR; INTRAVENOUS; SOFT TISSUE ONCE
Refills: 0 | Status: COMPLETED | OUTPATIENT
Start: 2025-01-31 | End: 2025-01-31

## 2025-01-31 RX ORDER — BACTERIOSTATIC SODIUM CHLORIDE 0.9 %
1000 VIAL (ML) INJECTION ONCE
Refills: 0 | Status: COMPLETED | OUTPATIENT
Start: 2025-01-31 | End: 2025-01-31

## 2025-01-31 RX ORDER — ACETAMINOPHEN 160 MG/5ML
1000 SUSPENSION ORAL ONCE
Refills: 0 | Status: COMPLETED | OUTPATIENT
Start: 2025-01-31 | End: 2025-01-31

## 2025-01-31 RX ADMIN — METOCLOPRAMIDE 10 MILLIGRAM(S): 10 TABLET ORAL at 11:18

## 2025-01-31 RX ADMIN — DEXAMETHASONE SODIUM PHOSPHATE 6 MILLIGRAM(S): 4 INJECTION, SOLUTION INTRA-ARTICULAR; INTRALESIONAL; INTRAMUSCULAR; INTRAVENOUS; SOFT TISSUE at 11:18

## 2025-01-31 RX ADMIN — Medication 1000 MILLILITER(S): at 11:17

## 2025-01-31 RX ADMIN — IPRATROPIUM BROMIDE AND ALBUTEROL SULFATE 3 MILLILITER(S): .5; 2.5 SOLUTION RESPIRATORY (INHALATION) at 11:17

## 2025-01-31 RX ADMIN — ACETAMINOPHEN 400 MILLIGRAM(S): 160 SUSPENSION ORAL at 11:17

## 2025-01-31 NOTE — ED ADULT TRIAGE NOTE - CHIEF COMPLAINT QUOTE
pt c/o fever, URI s/s and ABD pain w/ NVD x 12 days. Pt states chest pain and SOB started 2 days ago. Pt has HX of asthma. Pt states she "took some of her families amoxicillin but didn't finish it bc she developed a rash on her face". + nausea in triage

## 2025-01-31 NOTE — ED ADULT NURSE NOTE - CHIEF COMPLAINT
Order faxed to [facility] The patient is a 29y Female complaining of shortness of breath.

## 2025-01-31 NOTE — ED PROVIDER NOTE - PATIENT PORTAL LINK FT
You can access the FollowMyHealth Patient Portal offered by [facility] by registering at the following website: [email or web address]. By joining [employer]’s FollowMyHealth portal, you will also be able to view your health information using other applications (apps) compatible with our system.

## 2025-01-31 NOTE — ED PROVIDER NOTE - OBJECTIVE STATEMENT
27 y/o F with PMhx dural venous sinus thrombosis currently off eliquis ( no new ha - stable as per pt), asthma pw RI symptoms for 10 to 12 days.  Initially started as fevers body aches cough congestion fevers improved but still coughing a lot and congested with green sputum intermittent nausea vomiting initially and diarrhea and now has become consistent with nausea cannot keep anything down.  Positive sick contact with pneumonia at home.  States shortness of breath and pain in chest with cough but also when she takes a deep breath.   Notes some intermittent abdominal pain with diarrhea but mostly epigastric after vomiting. Denies palpitations/ /rash/dizziness//c/dysuria/hematuria. no hx of dvt/pe no recent travel

## 2025-01-31 NOTE — ED ADULT NURSE NOTE - OBJECTIVE STATEMENT
Pt is a 29y F, AOx4, presenting to [facility] c/o vomiting/nausea/diarrhea, SOB, headaches, sore throat., congestion, fever highest recoreded 101F, and chest pain r/t cough  x12 days. Pt denies PMH asthma. Pt denies any other type of pain, dysuria, dizziness, or any other sx at this time. Resp even and unlabored, airway patent. Bed locked and in lowest position.

## 2025-01-31 NOTE — ED PROVIDER NOTE - NSFOLLOWUPINSTRUCTIONS_ED_ALL_ED_FT
return to the ED if symptoms worsen, fever/chills, nausea/vomiting, chest pain , shortness of breath left upper abdominal pain. Stay away from contact sports, no skiing. follow up with PMD within 1 week

## 2025-01-31 NOTE — ED PROVIDER NOTE - CLINICAL SUMMARY MEDICAL DECISION MAKING FREE TEXT BOX
29 y/o F with PMhx dural venous sinus thrombosis currently off eliquis ( no new ha - stable as per pt), asthma pw RI symptoms for 10 to 12 days.  Initially started as fevers body aches cough congestion fevers improved but still coughing a lot and congested with green sputum intermittent nausea vomiting initially and diarrhea and now has become consistent with nausea cannot keep anything down.  Positive sick contact with pneumonia at home.  States shortness of breath and pain in chest with cough but also when she takes a deep breath.   Notes some intermittent abdominal pain with diarrhea but mostly epigastric after vomiting. Denies palpitations/ /rash/dizziness//c/dysuria/hematuria. no hx of dvt/pe no recent travel    --most likely viral illness; will check cxr to ro pna fluids reglan po challenge reasses 29 y/o F with PMhx dural venous sinus thrombosis currently off eliquis ( no new ha - stable as per pt), asthma pw RI symptoms for 10 to 12 days.  Initially started as fevers body aches cough congestion fevers improved but still coughing a lot and congested with green sputum intermittent nausea vomiting initially and diarrhea and now has become consistent with nausea cannot keep anything down.  Positive sick contact with pneumonia at home.  States shortness of breath and pain in chest with cough but also when she takes a deep breath.   Notes some intermittent abdominal pain with diarrhea but mostly epigastric after vomiting. Denies palpitations/ /rash/dizziness//c/dysuria/hematuria. no hx of dvt/pe no recent travel    --most likely viral illness; will check cxr to ro pna fluids reglan po challenge reasses  feeling much better + for mono screen - which would explain why she had a rash when taking amoxicillin  tolerating po  will dc with return precautions

## 2025-02-01 LAB
EBV EA AB SER IA-ACNC: <5 U/ML — SIGNIFICANT CHANGE UP
EBV EA AB TITR SER IF: POSITIVE
EBV EA IGG SER-ACNC: NEGATIVE — SIGNIFICANT CHANGE UP
EBV NA IGG SER IA-ACNC: >600 U/ML — HIGH
EBV PATRN SPEC IB-IMP: SIGNIFICANT CHANGE UP
EBV VCA IGG AVIDITY SER QL IA: POSITIVE
EBV VCA IGM SER IA-ACNC: 13.8 U/ML — SIGNIFICANT CHANGE UP
EBV VCA IGM SER IA-ACNC: >750 U/ML — HIGH
EBV VCA IGM TITR FLD: NEGATIVE — SIGNIFICANT CHANGE UP

## 2025-02-10 DIAGNOSIS — Z01.818 ENCOUNTER FOR OTHER PREPROCEDURAL EXAMINATION: ICD-10-CM

## 2025-02-10 DIAGNOSIS — E66.01 MORBID (SEVERE) OBESITY DUE TO EXCESS CALORIES: ICD-10-CM

## 2025-02-12 ENCOUNTER — APPOINTMENT (OUTPATIENT)
Dept: SURGERY | Facility: CLINIC | Age: 30
End: 2025-02-12

## 2025-07-12 ENCOUNTER — EMERGENCY (EMERGENCY)
Facility: HOSPITAL | Age: 30
LOS: 1 days | End: 2025-07-12
Attending: EMERGENCY MEDICINE
Payer: MEDICAID

## 2025-07-12 VITALS
OXYGEN SATURATION: 99 % | DIASTOLIC BLOOD PRESSURE: 91 MMHG | TEMPERATURE: 98 F | HEART RATE: 85 BPM | SYSTOLIC BLOOD PRESSURE: 154 MMHG | HEIGHT: 66 IN | RESPIRATION RATE: 20 BRPM | WEIGHT: 243.83 LBS

## 2025-07-12 DIAGNOSIS — Z98.890 OTHER SPECIFIED POSTPROCEDURAL STATES: Chronic | ICD-10-CM

## 2025-07-12 LAB
ALBUMIN SERPL ELPH-MCNC: 4 G/DL — SIGNIFICANT CHANGE UP (ref 3.3–5.2)
ALP SERPL-CCNC: 53 U/L — SIGNIFICANT CHANGE UP (ref 40–120)
ALT FLD-CCNC: 117 U/L — HIGH
ANION GAP SERPL CALC-SCNC: 14 MMOL/L — SIGNIFICANT CHANGE UP (ref 5–17)
AST SERPL-CCNC: 78 U/L — HIGH
BASOPHILS # BLD AUTO: 0.03 K/UL — SIGNIFICANT CHANGE UP (ref 0–0.2)
BASOPHILS NFR BLD AUTO: 0.4 % — SIGNIFICANT CHANGE UP (ref 0–2)
BILIRUB SERPL-MCNC: 0.5 MG/DL — SIGNIFICANT CHANGE UP (ref 0.4–2)
BUN SERPL-MCNC: 11.9 MG/DL — SIGNIFICANT CHANGE UP (ref 8–20)
CALCIUM SERPL-MCNC: 9.1 MG/DL — SIGNIFICANT CHANGE UP (ref 8.4–10.5)
CHLORIDE SERPL-SCNC: 105 MMOL/L — SIGNIFICANT CHANGE UP (ref 96–108)
CO2 SERPL-SCNC: 22 MMOL/L — SIGNIFICANT CHANGE UP (ref 22–29)
CREAT SERPL-MCNC: 0.67 MG/DL — SIGNIFICANT CHANGE UP (ref 0.5–1.3)
EGFR: 123 ML/MIN/1.73M2 — SIGNIFICANT CHANGE UP
EGFR: 123 ML/MIN/1.73M2 — SIGNIFICANT CHANGE UP
EOSINOPHIL # BLD AUTO: 0.14 K/UL — SIGNIFICANT CHANGE UP (ref 0–0.5)
EOSINOPHIL NFR BLD AUTO: 1.7 % — SIGNIFICANT CHANGE UP (ref 0–6)
GAS PNL BLDV: SIGNIFICANT CHANGE UP
GLUCOSE SERPL-MCNC: 83 MG/DL — SIGNIFICANT CHANGE UP (ref 70–99)
HCG SERPL-ACNC: <4 MIU/ML — SIGNIFICANT CHANGE UP
HCT VFR BLD CALC: 42.7 % — SIGNIFICANT CHANGE UP (ref 34.5–45)
HGB BLD-MCNC: 14.6 G/DL — SIGNIFICANT CHANGE UP (ref 11.5–15.5)
IMM GRANULOCYTES # BLD AUTO: 0.03 K/UL — SIGNIFICANT CHANGE UP (ref 0–0.07)
IMM GRANULOCYTES NFR BLD AUTO: 0.4 % — SIGNIFICANT CHANGE UP (ref 0–0.9)
LIDOCAIN IGE QN: 24 U/L — SIGNIFICANT CHANGE UP (ref 22–51)
LYMPHOCYTES # BLD AUTO: 2.38 K/UL — SIGNIFICANT CHANGE UP (ref 1–3.3)
LYMPHOCYTES NFR BLD AUTO: 28.9 % — SIGNIFICANT CHANGE UP (ref 13–44)
MCHC RBC-ENTMCNC: 28 PG — SIGNIFICANT CHANGE UP (ref 27–34)
MCHC RBC-ENTMCNC: 34.2 G/DL — SIGNIFICANT CHANGE UP (ref 32–36)
MCV RBC AUTO: 81.8 FL — SIGNIFICANT CHANGE UP (ref 80–100)
MONOCYTES # BLD AUTO: 0.6 K/UL — SIGNIFICANT CHANGE UP (ref 0–0.9)
MONOCYTES NFR BLD AUTO: 7.3 % — SIGNIFICANT CHANGE UP (ref 2–14)
NEUTROPHILS # BLD AUTO: 5.06 K/UL — SIGNIFICANT CHANGE UP (ref 1.8–7.4)
NEUTROPHILS NFR BLD AUTO: 61.3 % — SIGNIFICANT CHANGE UP (ref 43–77)
NRBC # BLD AUTO: 0 K/UL — SIGNIFICANT CHANGE UP (ref 0–0)
NRBC # FLD: 0 K/UL — SIGNIFICANT CHANGE UP (ref 0–0)
NRBC BLD AUTO-RTO: 0 /100 WBCS — SIGNIFICANT CHANGE UP (ref 0–0)
PLATELET # BLD AUTO: 244 K/UL — SIGNIFICANT CHANGE UP (ref 150–400)
PMV BLD: 12.2 FL — SIGNIFICANT CHANGE UP (ref 7–13)
POTASSIUM SERPL-MCNC: 3.5 MMOL/L — SIGNIFICANT CHANGE UP (ref 3.5–5.3)
POTASSIUM SERPL-SCNC: 3.5 MMOL/L — SIGNIFICANT CHANGE UP (ref 3.5–5.3)
PROT SERPL-MCNC: 7.3 G/DL — SIGNIFICANT CHANGE UP (ref 6.6–8.7)
RBC # BLD: 5.22 M/UL — HIGH (ref 3.8–5.2)
RBC # FLD: 13.2 % — SIGNIFICANT CHANGE UP (ref 10.3–14.5)
SODIUM SERPL-SCNC: 141 MMOL/L — SIGNIFICANT CHANGE UP (ref 135–145)
WBC # BLD: 8.24 K/UL — SIGNIFICANT CHANGE UP (ref 3.8–10.5)
WBC # FLD AUTO: 8.24 K/UL — SIGNIFICANT CHANGE UP (ref 3.8–10.5)

## 2025-07-12 PROCEDURE — 85014 HEMATOCRIT: CPT

## 2025-07-12 PROCEDURE — 85025 COMPLETE CBC W/AUTO DIFF WBC: CPT

## 2025-07-12 PROCEDURE — 84702 CHORIONIC GONADOTROPIN TEST: CPT

## 2025-07-12 PROCEDURE — 71045 X-RAY EXAM CHEST 1 VIEW: CPT | Mod: 26

## 2025-07-12 PROCEDURE — 84484 ASSAY OF TROPONIN QUANT: CPT

## 2025-07-12 PROCEDURE — 76705 ECHO EXAM OF ABDOMEN: CPT | Mod: 26

## 2025-07-12 PROCEDURE — 82803 BLOOD GASES ANY COMBINATION: CPT

## 2025-07-12 PROCEDURE — 82435 ASSAY OF BLOOD CHLORIDE: CPT

## 2025-07-12 PROCEDURE — 36415 COLL VENOUS BLD VENIPUNCTURE: CPT

## 2025-07-12 PROCEDURE — 83605 ASSAY OF LACTIC ACID: CPT

## 2025-07-12 PROCEDURE — 93010 ELECTROCARDIOGRAM REPORT: CPT | Mod: 76

## 2025-07-12 PROCEDURE — 82550 ASSAY OF CK (CPK): CPT

## 2025-07-12 PROCEDURE — 84295 ASSAY OF SERUM SODIUM: CPT

## 2025-07-12 PROCEDURE — 82947 ASSAY GLUCOSE BLOOD QUANT: CPT

## 2025-07-12 PROCEDURE — 76705 ECHO EXAM OF ABDOMEN: CPT

## 2025-07-12 PROCEDURE — 99285 EMERGENCY DEPT VISIT HI MDM: CPT

## 2025-07-12 PROCEDURE — 82330 ASSAY OF CALCIUM: CPT

## 2025-07-12 PROCEDURE — 85018 HEMOGLOBIN: CPT

## 2025-07-12 PROCEDURE — 84443 ASSAY THYROID STIM HORMONE: CPT

## 2025-07-12 PROCEDURE — 71045 X-RAY EXAM CHEST 1 VIEW: CPT

## 2025-07-12 PROCEDURE — 80053 COMPREHEN METABOLIC PANEL: CPT

## 2025-07-12 PROCEDURE — 83690 ASSAY OF LIPASE: CPT

## 2025-07-12 PROCEDURE — 84436 ASSAY OF TOTAL THYROXINE: CPT

## 2025-07-12 PROCEDURE — 84132 ASSAY OF SERUM POTASSIUM: CPT

## 2025-07-12 RX ORDER — ONDANSETRON HCL/PF 4 MG/2 ML
4 VIAL (ML) INJECTION ONCE
Refills: 0 | Status: COMPLETED | OUTPATIENT
Start: 2025-07-12 | End: 2025-07-12

## 2025-07-12 RX ORDER — ACETAMINOPHEN 500 MG/5ML
1000 LIQUID (ML) ORAL ONCE
Refills: 0 | Status: COMPLETED | OUTPATIENT
Start: 2025-07-12 | End: 2025-07-12

## 2025-07-12 RX ADMIN — Medication 6 MILLIGRAM(S): at 21:31

## 2025-07-12 RX ADMIN — Medication 400 MILLIGRAM(S): at 21:32

## 2025-07-12 RX ADMIN — Medication 4 MILLIGRAM(S): at 21:31

## 2025-07-13 VITALS
HEART RATE: 78 BPM | RESPIRATION RATE: 18 BRPM | SYSTOLIC BLOOD PRESSURE: 146 MMHG | OXYGEN SATURATION: 98 % | DIASTOLIC BLOOD PRESSURE: 72 MMHG | TEMPERATURE: 98 F

## 2025-07-13 PROCEDURE — 84484 ASSAY OF TROPONIN QUANT: CPT

## 2025-07-13 PROCEDURE — 84436 ASSAY OF TOTAL THYROXINE: CPT

## 2025-07-13 PROCEDURE — 82435 ASSAY OF BLOOD CHLORIDE: CPT

## 2025-07-13 PROCEDURE — 85018 HEMOGLOBIN: CPT

## 2025-07-13 PROCEDURE — 82550 ASSAY OF CK (CPK): CPT

## 2025-07-13 PROCEDURE — 82330 ASSAY OF CALCIUM: CPT

## 2025-07-13 PROCEDURE — 71045 X-RAY EXAM CHEST 1 VIEW: CPT

## 2025-07-13 PROCEDURE — 84295 ASSAY OF SERUM SODIUM: CPT

## 2025-07-13 PROCEDURE — 93005 ELECTROCARDIOGRAM TRACING: CPT

## 2025-07-13 PROCEDURE — 82803 BLOOD GASES ANY COMBINATION: CPT

## 2025-07-13 PROCEDURE — 71275 CT ANGIOGRAPHY CHEST: CPT

## 2025-07-13 PROCEDURE — 83605 ASSAY OF LACTIC ACID: CPT

## 2025-07-13 PROCEDURE — 85014 HEMATOCRIT: CPT

## 2025-07-13 PROCEDURE — 82947 ASSAY GLUCOSE BLOOD QUANT: CPT

## 2025-07-13 PROCEDURE — 84443 ASSAY THYROID STIM HORMONE: CPT

## 2025-07-13 PROCEDURE — 84702 CHORIONIC GONADOTROPIN TEST: CPT

## 2025-07-13 PROCEDURE — 80053 COMPREHEN METABOLIC PANEL: CPT

## 2025-07-13 PROCEDURE — 96375 TX/PRO/DX INJ NEW DRUG ADDON: CPT | Mod: XU

## 2025-07-13 PROCEDURE — 74177 CT ABD & PELVIS W/CONTRAST: CPT | Mod: 26

## 2025-07-13 PROCEDURE — 36415 COLL VENOUS BLD VENIPUNCTURE: CPT

## 2025-07-13 PROCEDURE — 76705 ECHO EXAM OF ABDOMEN: CPT

## 2025-07-13 PROCEDURE — 84132 ASSAY OF SERUM POTASSIUM: CPT

## 2025-07-13 PROCEDURE — 71275 CT ANGIOGRAPHY CHEST: CPT | Mod: 26

## 2025-07-13 PROCEDURE — 83690 ASSAY OF LIPASE: CPT

## 2025-07-13 PROCEDURE — 85025 COMPLETE CBC W/AUTO DIFF WBC: CPT

## 2025-07-13 PROCEDURE — 99285 EMERGENCY DEPT VISIT HI MDM: CPT | Mod: 25

## 2025-07-13 PROCEDURE — 96374 THER/PROPH/DIAG INJ IV PUSH: CPT | Mod: XU

## 2025-07-13 PROCEDURE — 74177 CT ABD & PELVIS W/CONTRAST: CPT

## 2025-07-13 RX ORDER — METOCLOPRAMIDE HCL 10 MG
10 TABLET ORAL ONCE
Refills: 0 | Status: COMPLETED | OUTPATIENT
Start: 2025-07-13 | End: 2025-07-13

## 2025-07-13 RX ADMIN — Medication 10 MILLIGRAM(S): at 01:08
